# Patient Record
Sex: FEMALE | Race: WHITE | NOT HISPANIC OR LATINO | Employment: OTHER | URBAN - METROPOLITAN AREA
[De-identification: names, ages, dates, MRNs, and addresses within clinical notes are randomized per-mention and may not be internally consistent; named-entity substitution may affect disease eponyms.]

---

## 2017-02-24 ENCOUNTER — DOCUMENTATION ONLY (OUTPATIENT)
Dept: SLEEP MEDICINE | Facility: CLINIC | Age: 47
End: 2017-02-24

## 2017-02-24 NOTE — PROGRESS NOTES
Patient was seen for a mask fitting, she is currently wearing a wilcox FX pillow mask and would like to try a nasal mask. Patient chose the RM Airfit N20 mask.

## 2017-03-15 ENCOUNTER — TRANSFERRED RECORDS (OUTPATIENT)
Dept: HEALTH INFORMATION MANAGEMENT | Facility: CLINIC | Age: 47
End: 2017-03-15

## 2017-04-02 ENCOUNTER — HOSPITAL ENCOUNTER (EMERGENCY)
Facility: CLINIC | Age: 47
Discharge: HOME | End: 2017-04-02
Attending: PHYSICIAN ASSISTANT | Admitting: PHYSICIAN ASSISTANT
Payer: COMMERCIAL

## 2017-04-02 DIAGNOSIS — N30.00 ACUTE CYSTITIS WITHOUT HEMATURIA: ICD-10-CM

## 2017-04-02 LAB
ALBUMIN UR-MCNC: 10 MG/DL
APPEARANCE UR: ABNORMAL
BILIRUB UR QL STRIP: NEGATIVE
CAOX CRY #/AREA URNS HPF: ABNORMAL /HPF
COLOR UR AUTO: YELLOW
GLUCOSE UR STRIP-MCNC: NEGATIVE MG/DL
HGB UR QL STRIP: NEGATIVE
HYALINE CASTS #/AREA URNS LPF: 2 /LPF (ref 0–2)
KETONES UR STRIP-MCNC: 5 MG/DL
LEUKOCYTE ESTERASE UR QL STRIP: ABNORMAL
MUCOUS THREADS #/AREA URNS LPF: PRESENT /LPF
NITRATE UR QL: NEGATIVE
PH UR STRIP: 5.5 PH (ref 5–7)
RBC #/AREA URNS AUTO: 2 /HPF (ref 0–2)
SP GR UR STRIP: 1.02 (ref 1–1.03)
SQUAMOUS #/AREA URNS AUTO: 11 /HPF (ref 0–1)
URN SPEC COLLECT METH UR: ABNORMAL
UROBILINOGEN UR STRIP-MCNC: 2 MG/DL (ref 0–2)
WBC #/AREA URNS AUTO: 7 /HPF (ref 0–2)

## 2017-04-02 PROCEDURE — 99283 EMERGENCY DEPT VISIT LOW MDM: CPT

## 2017-04-02 PROCEDURE — 81001 URINALYSIS AUTO W/SCOPE: CPT | Performed by: PHYSICIAN ASSISTANT

## 2017-04-02 PROCEDURE — 87086 URINE CULTURE/COLONY COUNT: CPT | Performed by: PHYSICIAN ASSISTANT

## 2017-04-02 ASSESSMENT — ENCOUNTER SYMPTOMS
BACK PAIN: 0
HEMATURIA: 0

## 2017-04-03 LAB
BACTERIA SPEC CULT: NORMAL
Lab: NORMAL
MICRO REPORT STATUS: NORMAL
SPECIMEN SOURCE: NORMAL

## 2017-04-03 NOTE — ED PROVIDER NOTES
History     Chief Complaint:  UTI    HPI   Johanna Dominique is a 46 year old female who presents with a history of diabetes and UTIs, who presents with suprapubic pressure, dysuria and high frequency of urination. An associated symptom is a cold sore on her lower lip, which she has experiences in the presence of a fever, but has not otherwise felt feverish or taken her temperatire. This is consistent with her previous UTIs. This started while she was on vacation in [place]. She denies any back or flank pain, hematuria, abdominal pain, nausea or vomiting, vaginal symptoms, or any other complications or concerns.   Allergies:  NKDA      Medications:    Victoza  Metformin  Lantus  Hydrochlorothiazide     Past Medical History:    HTN  Type II DM  MO  Chronic nasal congestion      Past Surgical History:   Hysterectomy  Sinus surgery  Hip surgery x3      Family History:   History reviewed. No pertinent family history.     Social History:  The patient presented to the ED alone.   Smoking Status: Current every day smoker - 1.00 packs/day for 6 years  Smokeless Tobacco: Never used  Alcohol Use: No   Marital Status: Single     Review of Systems   Constitutional: Negative for fever.   Gastrointestinal: Negative for abdominal pain, nausea and vomiting.   Genitourinary: Positive for dysuria, frequency and pelvic pain. Negative for difficulty urinating, flank pain, hematuria, urgency, vaginal discharge and vaginal pain.   Musculoskeletal: Negative for back pain.   Skin: Positive for wound.        Positive for sores.   All other systems reviewed and are negative.        Physical Exam   First Vitals:   BP: 143/71  Pulse: 71 Resp: 18 SpO2: 96% on RA Temp: 98.6 oral      Physical Exam  General: Resting comfortably on the gurney.    Head:  The scalp, head and face appear normal. No evidence of trauma.   ENT:  Pupils are equal, round and reactive to light. EOM intact.     Oropharynx is moist.  No uvular deviation. Posterior oropharynx is  without erythema, exudate or tonsillar swelling.   Neck:  Supple, no rigidity noted. Normal ROM.     Trachea midline. No mass detected.    Lymph: No anterior or posterior cervical lymphadenopathy noted.   Resp:  Non-labored breathing. No tachypnea.     Lung fields clear to auscultation without wheezes or rales.   CV:  Regular rate and rhythm. Normal S1 and S2, no S3 or S4.     No pathological murmur detected.     Radial, DP and PT pulses intact and symmetric.   GI:  Abdomen is soft and non-distended.     Non-tender to palpation in all four quadrants.    MS:  Normal muscular tone.     Normal and symmetric motor strength of all four extremities.     No asymmetrical lower leg swelling or calf tenderness.   Neuro:  Awake and alert. Speech is clear.   Skin:  Just inferior to the lower lip on the right side there is a 0.2 cm raised lesion that has an erythematous base with overlying crusting. No fluctuance or spreading erythema.   Psych: Normal affect. Appropriate interactions.         Emergency Department Course     Laboratory:  Urine analysis: No bacteria, mucous present, squamous epithelial 11/HPF, trace leukocyte  esterase, albumin 10 mg/DL, urobilinogen 2.0 mg/dL, ketones 5 mg/dL, pH 5.5   UC: Pending  Emergency Department Course:  Nursing notes and vitals reviewed.  I performed an exam of the patient as documented above.  The above workup was undertaken.  1839: I rechecked the patient and discussed results.  Findings and plan explained to the Patient. Patient discharged home, status improved, with instructions regarding supportive care, medications, and reasons to return as well as the importance of close follow-up was reviewed. Patient was prescribed Keflex.    Impression & Plan      Medical Decision Making:  This patient has symptoms consistent with a urinary tract infection.  Urinalysis shows a few WBC's, but also signs of contamination. No obvious infection at this time, urine culture is pending. However, the  patient has a symptomatic UTI and history of them. I do feel it is reasonable to start her on antibiotic for this.  There has been no fever, back/flank pain or significant abdominal pain.  There is no clinical evidence of pyelonephritis, appendicitis,  or diverticulitis.  Follow up with primary physician is indicated if not improving in 2-3 days. I discussed the results, plan and any additional questions with the patient. She verbalized understanding and agreement with the plan.  We discussed reasons to return to the ED including increasing pain, vomiting, fever, inability to tolerate the oral antibiotic or if she becomes worse in any way.        Diagnosis:  1. Acute cystitis without hematuria         Disposition:  Discharged to home    Discharge Medications:   Keflex    CHAD, Bea Willis, am serving as a scribe on 4/2/2017 at 7:46 PM to personally document services performed by Trish Bowden based on my observations and the provider's statements to me.    EMERGENCY DEPARTMENT       Trish Bowden PA-C  04/04/17 4812

## 2017-04-04 ASSESSMENT — ENCOUNTER SYMPTOMS
NAUSEA: 0
FEVER: 0
DYSURIA: 1
FREQUENCY: 1
WOUND: 1
ABDOMINAL PAIN: 0
DIFFICULTY URINATING: 0
FLANK PAIN: 0
VOMITING: 0

## 2017-06-26 DIAGNOSIS — Z53.9 ERRONEOUS ENCOUNTER--DISREGARD: Primary | ICD-10-CM

## 2017-08-19 ENCOUNTER — HEALTH MAINTENANCE LETTER (OUTPATIENT)
Age: 47
End: 2017-08-19

## 2018-03-07 ENCOUNTER — DOCUMENTATION ONLY (OUTPATIENT)
Dept: SLEEP MEDICINE | Facility: CLINIC | Age: 48
End: 2018-03-07
Payer: COMMERCIAL

## 2018-03-07 NOTE — PROGRESS NOTES
JAN WANTED TO TRY FULL FACE MASK HEADGEAR DUE TO MOUTH BREATHING.  SHE TRIED AIRFIT F20 AND SIMPLUS MEDIUM.  HER MASK OF CHOICE IS AIRFIT F2O MEDIUM.  WENT OVER SUPPLY REPLACMENT SCHEDULE.  SHE IS GOPING TO % COTTON T-SHIRT AS A BARRIER ON FACE BEFORE PUTTING ON MASK TO PREVENT RED MARKS.

## 2018-05-19 ENCOUNTER — HOSPITAL ENCOUNTER (EMERGENCY)
Facility: CLINIC | Age: 48
Discharge: HOME | End: 2018-05-19
Attending: EMERGENCY MEDICINE | Admitting: EMERGENCY MEDICINE
Payer: COMMERCIAL

## 2018-05-19 VITALS
HEIGHT: 66 IN | RESPIRATION RATE: 16 BRPM | WEIGHT: 270 LBS | BODY MASS INDEX: 43.39 KG/M2 | SYSTOLIC BLOOD PRESSURE: 147 MMHG | DIASTOLIC BLOOD PRESSURE: 76 MMHG | TEMPERATURE: 99.6 F | OXYGEN SATURATION: 98 %

## 2018-05-19 DIAGNOSIS — B95.8 STAPH SKIN INFECTION: ICD-10-CM

## 2018-05-19 DIAGNOSIS — L08.9 STAPH SKIN INFECTION: ICD-10-CM

## 2018-05-19 PROCEDURE — 99282 EMERGENCY DEPT VISIT SF MDM: CPT

## 2018-05-19 NOTE — ED AVS SNAPSHOT
Emergency Department    [address] [facility] [address]    Phone:  [phone]    Fax:  [phone]                                       Johanna Dominique   MRN: [id number]    Department:   Emergency Department   Date of Visit:  5/19/2018           Patient Information     Date Of Birth          1970        Your diagnoses for this visit were:     Staph skin infection        You were seen by Johanna Bolaños MD.      Follow-up Information     Follow up with Pat Perez MD.    Specialty:  Internal Medicine    Why:  this week. And Infectious Disease at your clinic as well.     Contact information:    [facility] [place]  [address] [facility] [id number] [phone]        Discharge References/Attachments     STAPH SKIN INFECTION, POSSIBLE MRSA (ENGLISH)    STAPH INFECTION (NON-MRSA) (ENGLISH)      24 Hour Appointment Hotline       To make an appointment at any [facility], call [phone] ([phone]). If you don't have a family doctor or clinic, we will help you find one. [facility] are conveniently located to serve the needs of you and your family.             Review of your medicines      Our records show that you are taking the medicines listed below. If these are incorrect, please call your family doctor or clinic.        Dose / Directions Last dose taken    ATORVASTATIN CALCIUM PO        Refills:  0        hydrochlorothiazide 25 MG tablet   Commonly known as:  HYDRODIURIL   Dose:  25 mg        Take 25 mg by mouth daily.   Refills:  0        insulin glargine 100 UNIT/ML injection   Commonly known as:  LANTUS   Dose:  40 Units        Inject 40 Units Subcutaneous At Bedtime   Refills:  0        liraglutide 18 MG/3ML soln   Commonly known as:  VICTOZA   Dose:  1.2 mg        Inject 1.2 mg Subcutaneous daily   Refills:  0        METFORMIN HCL PO   Dose:  2000 mg        Take 2,000 mg by mouth States takes 2000mg once a day at dinner time   Refills:   0        order for DME   Quantity:  1 each        Please issue a cpap machine set at 15cmH2 w heated humidifier and nasal mask   Refills:  0                Orders Needing Specimen Collection     None      Pending Results     No orders found from 5/17/2018 to 5/20/2018.            Pending Culture Results     No orders found from 5/17/2018 to 5/20/2018.            Pending Results Instructions     If you had any lab results that were not finalized at the time of your Discharge, you can call the ED Lab Result RN at [phone]. You will be contacted by this team for any positive Lab results or changes in treatment. The nurses are available 7 days a week from 10A to 6:30P.  You can leave a message 24 hours per day and they will return your call.        Test Results From Your Hospital Stay               Clinical Quality Measure: Blood Pressure Screening     Your blood pressure was checked while you were in the emergency department today. The last reading we obtained was  BP: 183/83 . Please read the guidelines below about what these numbers mean and what you should do about them.  If your systolic blood pressure (the top number) is less than 120 and your diastolic blood pressure (the bottom number) is less than 80, then your blood pressure is normal. There is nothing more that you need to do about it.  If your systolic blood pressure (the top number) is 120-139 or your diastolic blood pressure (the bottom number) is 80-89, your blood pressure may be higher than it should be. You should have your blood pressure rechecked within a year by a primary care provider.  If your systolic blood pressure (the top number) is 140 or greater or your diastolic blood pressure (the bottom number) is 90 or greater, you may have high blood pressure. High blood pressure is treatable, but if left untreated over time it can put you at risk for heart attack, stroke, or kidney failure. You should have your blood pressure rechecked by a primary  care provider within the next 4 weeks.  If your provider in the emergency department today gave you specific instructions to follow-up with your doctor or provider even sooner than that, you should follow that instruction and not wait for up to 4 weeks for your follow-up visit.        Thank you for choosing [facility]       Thank you for choosing [facility] for your care. Our goal is always to provide you with excellent care. Hearing back from our patients is one way we can continue to improve our services. Please take a few minutes to complete the written survey that you may receive in the mail after you visit with us. Thank you!        [facility] Information     [facility] gives you secure access to your electronic health record. If you see a primary care provider, you can also send messages to your care team and make appointments. If you have questions, please call your primary care clinic.  If you do not have a primary care provider, please call [phone] and they will assist you.        Care EveryWhere ID     This is your Care EveryWhere ID. This could be used by other organizations to access your [facility] medical records  [id number]        Equal Access to Services     CIERRA GIL : Hadii kalpana perez Soyesica, waaxda luqadaha, qaybta kaalmajing muñoz, juventino milan. So [facility] [phone].    ATENCIÓN: Si habla español, tiene a hernandez disposición servicios gratuitos de asistencia lingüística. Llame al [phone].    We comply with applicable federal civil rights laws and [place] laws. We do not discriminate on the basis of race, color, national origin, age, disability, sex, sexual orientation, or gender identity.            After Visit Summary       This is your record. Keep this with you and show to your community pharmacist(s) and doctor(s) at your next visit.

## 2018-05-19 NOTE — ED AVS SNAPSHOT
Emergency Department    [address] [place] [address]    Phone:  [phone]    Fax:  [phone]                                       Johanna Dominique   MRN: [id number]    Department:   Emergency Department   Date of Visit:  5/19/2018           After Visit Summary Signature Page     I have received my discharge instructions, and my questions have been answered. I have discussed any challenges I see with this plan with the nurse or doctor.    ..........................................................................................................................................  Patient/Patient Representative Signature      ..........................................................................................................................................  Patient Representative Print Name and Relationship to Patient    ..................................................               ................................................  Date                                            Time    ..........................................................................................................................................  Reviewed by Signature/Title    ...................................................              ..............................................  Date                                                            Time

## 2018-05-19 NOTE — ED NOTES
"  History     Chief Complaint:  Skin lesions       HPI   Johanna Dominique is a 47 year old female who presents with skin lesions.  She has a several month history of having small areas of what sounds like abscesses on her skin that then drain and heal.  She notes some new ones on her hand and has some healing ones on her right breast and left flank.  She notes that the past she had something like this and did test positive for staph on the  nasal swab a number of years ago.  Then she was better for a while and now she again is having these symptoms.  She does not know if she tested positive for MRSA.  Boyfriend does not have similar symptoms.  She does have a low-grade fever here and says that she has been generally tired and the boyfriend's been ill with viral illness.  She denies any specific complaints related to acute illness herself.      Allergies:  No known drug allergies.     Medications:    Atorvastatin  HCTZ  Lantus  Victoza  Metformin     Past Medical History:    Allergic state  Diabetes  HTN    Past Surgical History:    Hysterectomy  Hip surgery  Sinus surgery    Family History:    History reviewed. No pertinent family history.    Social History:   reports that she has been smoking Cigarettes.  She has a 6.00 pack-year smoking history. She has never used smokeless tobacco. She reports that she does not drink alcohol or use illicit drugs.  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    PCP: Pat Perez     Review of Systems all other systems are negative other than HPI.      Physical Exam   Patient Vitals for the past 24 hrs:   BP Temp Temp src Heart Rate Resp SpO2 Height Weight   05/19/18 1757 183/83 99.6  F (37.6  C) Oral 99 18 96 % 1.676 m (5' 6\") 122.5 kg (270 lb)        Physical Exam     Constitutional:  Oriented to person, place, and time.      Appears well-developed and well-nourished.   HENT:   Head:    Normocephalic and atraumatic.   Right Ear:   Tympanic membrane and external " ear normal.   Left Ear:   Tympanic membrane and external ear normal.   Mouth/Throat:   Oropharynx is clear and moist.      Mucous membranes are normal.   Eyes:    Conjunctivae normal and EOM are normal.      Pupils are equal, round, and reactive to light.   Neck:    Normal range of motion. Neck supple.   Cardiovascular:  Normal rate, regular rhythm, S1 normal and S2 normal.      No gallop and no friction rub. No murmur heard.  Pulmonary/Chest:  Breath sounds normal. No respiratory distress.      No wheezes. No rhonchi. No rales.   Abdominal:   Soft. No hepatosplenomegaly. No tenderness.      No rebound and no CVA tenderness.   Musculoskeletal:  Normal range of motion.   Neurological:   Alert and oriented to person, place, and time. Normal strength.      GCS eye subscore is 4. GCS verbal subscore is 5.      GCS motor subscore is 6.   Skin:    Skin is warm and dry.  She has 4- 5 small less than 1 cm lesions which do not appear acutely infected in terms of abscess requiring drainage.  These are present right breast right hand and left flank.  Psychiatric:   Normal mood and affect.      Speech is normal and behavior is normal.      Judgment and thought content normal.      Cognition and memory are normal.     Emergency Department Course     Emergency Department Course:  Past medical records, nursing notes, and vitals reviewed.  I performed an exam of the patient and obtained history, as documented above.    I rechecked the patient. Findings and plan explained to the Patient and .     Impression & Plan      Medical Decision Making:  Patient presents with several month history of occasional small skin lesions which sound like abscesses.  She has tested positive for staph in the past.  I suspect that she is again colonized.  I do not see anything that looks infected now or she needs oral antibiotics.  I have advised that she contact her primary physician and/or infectious disease to see if there is a protocol that she can  use to try and rid herself of this colonization although she was told that is not 100% effective.  She has a low-grade fever which I suspect is more viral as boyfriend is been ill.  She can follow-up with her primary if she continues to feel tired for further evaluation.       Diagnosis:    ICD-10-CM    1. Staph skin infection L08.9     B95.8         5/19/2018   Johanna Bolaños Hollensteiner, Lisa, MD  05/21/18 1935

## 2018-09-17 DIAGNOSIS — G47.33 OBSTRUCTIVE SLEEP APNEA: Primary | ICD-10-CM

## 2018-12-24 ENCOUNTER — OFFICE VISIT (OUTPATIENT)
Dept: SLEEP MEDICINE | Facility: CLINIC | Age: 48
End: 2018-12-24
Payer: COMMERCIAL

## 2018-12-24 VITALS
HEART RATE: 77 BPM | BODY MASS INDEX: 42.36 KG/M2 | WEIGHT: 263.6 LBS | HEIGHT: 66 IN | RESPIRATION RATE: 16 BRPM | DIASTOLIC BLOOD PRESSURE: 80 MMHG | OXYGEN SATURATION: 96 % | SYSTOLIC BLOOD PRESSURE: 128 MMHG

## 2018-12-24 DIAGNOSIS — G47.00 ACUTE INSOMNIA: ICD-10-CM

## 2018-12-24 DIAGNOSIS — E66.01 MORBID OBESITY (H): ICD-10-CM

## 2018-12-24 DIAGNOSIS — G47.33 OSA (OBSTRUCTIVE SLEEP APNEA): Primary | ICD-10-CM

## 2018-12-24 PROCEDURE — 99214 OFFICE O/P EST MOD 30 MIN: CPT | Performed by: INTERNAL MEDICINE

## 2018-12-24 ASSESSMENT — MIFFLIN-ST. JEOR: SCORE: 1832.43

## 2018-12-24 NOTE — PROGRESS NOTES
Obstructive Sleep Apnea - PAP Follow-Up Visit:    Chief Complaint   Patient presents with     Sleep Apnea     CPAP follow up        Johanna Dominique comes in today for follow-up of their mild sleep apnea, managed with CPAP.     Medical history is significant for DM-2.     A sleep study done on 03/22/2006 at [facility] in [place], [place] showed an apnea-hypopnea index of 12.6 per hour.  Sleep disordered breathing was predominantly supine position-related with a supine AHI of 28.4 per hour and lateral AHI of 4.4 per hour.  Lowest oxygen saturation was 84%.     Overall, she rates the experience with PAP as 10 (0 poor, 10 great). The mask is comfortable. The mask is not leaking.  She is not snoring with the mask on. She is not having gasp arousals.  She is not having significant oral/nasal dryness. The pressure settings are comfortable.     She uses nasal mask.     Over last 2 months, patient has experienced sleep initiation and maintenance insomnia. There were no clear precipitation factors.     Bedtime is typically 9 pm. Some nights, it can take upto 1-2 hour st fall asleep.  She may wake up in the night and stay awake for about 1-2 hours without being able to return to sleep.  Wake time in the morning is at 6 am. Patient is using PAP therapy 7 hours per night. The patient is usually getting 6 hours of sleep per night.    She does feel rested in the morning.    Wallace Sleepiness Scale: 14/24    ResMed     Auto-PAP 13-18 cmH2O download:  30/30 total days of use. 0 nonuse days. 29/30 days with >4 hours use.  Average use 7.8 hours per day. Median Leak 1.5 L/min. CPAP 95% pressure 15.2cm. AHI 1.8    Reviewed by team:      Reviewed by provider:        Problem List:  Patient Active Problem List    Diagnosis Date Noted     ERRONEOUS ENCOUNTER--DISREGARD 06/26/2017     Priority: Medium     Obstructive sleep apnea 12/16/2012     Priority: Medium     Problem list name updated by automated process. Provider to review    "    Obesity 12/16/2012     Priority: Medium     Chronic nasal congestion 12/16/2012     Priority: Medium          /80   Pulse 77   Resp 16   Ht 1.676 m (5' 6\")   Wt 119.6 kg (263 lb 9.6 oz)   SpO2 96%   BMI 42.55 kg/m      Impression/Plan:     Mild sleep apnea.     -  Tolerating PAP well. Regular compliance and normal AHI is demonstrated on the device. daytime fatigue is experienced over last 2 months with insomnia.     Plan:     1. Continue auto PAP therapy. Prescription provided for replacemenet device     Insomnia    - Patient has sleep initiation and maintenance difficulty over last 2 months without any clear precipitation factors. Psychophysiologic hyperarousal is experienced and patient spends long period in bed.    - She was counseled regarding better stimulus control and restriction of time in bed for management.     - If insomnia continues, return to clinic to be seen by behavioral sleep medicine.     Plan:      1. Continue auto PAP therapy     Johanna GRUBER Trip will follow up in about 1 year(s).     Twenty-five minutes spent with patient, all of which were spent face-to-face counseling, consulting, coordinating plan of care.      Chun Chou MD, MD    CC:  Pat Perez,   "

## 2018-12-24 NOTE — PATIENT INSTRUCTIONS
1. Obstructive sleep apnea    - Continue CPAP treatment     2. Insomnia     You have symptoms of insomnia and would likely benefit from non-medication approaches to treatment.  Cognitive behavioral treatment (CBT) for insomnia is a very effective technique that involves changing your behaviors and thoughts associated with sleep.  People that are motivated to make changes in their sleep pattern are likely to benefit from self-help strategies for treatment of insomnia.  Several treatment books for insomnia are listed on our patient treatment resources.      Suggested Resources  Insomnia Treatment Books     Overcoming Insomnia by Aurelio Lee and Elizabeth Ashton (2008)    No More Sleepless Nights by Carlos Alberto Weeks and Minnie Bobby (1996)    Say Xavi to Insomnia by Tae Holland (2009)    The Insomnia Workbook by Sana Posey and Ronaldo Cleary (2009)    The Insomnia Answer by Raudel Fermin and Pedro Funk (2006)    Your Body mass index is 42.55 kg/m .  Weight management is a personal decision.  If you are interested in exploring weight loss strategies, the following discussion covers the approaches that may be successful. Body mass index (BMI) is one way to tell whether you are at a healthy weight, overweight, or obese. It measures your weight in relation to your height.  A BMI of 18.5 to 24.9 is in the healthy range. A person with a BMI of 25 to 29.9 is considered overweight, and someone with a BMI of 30 or greater is considered obese. More than two-thirds of American adults are considered overweight or obese.  Being overweight or obese increases the risk for further weight gain. Excess weight may lead to heart disease and diabetes.  Creating and following plans for healthy eating and physical activity may help you improve your health.  Weight control is part of healthy lifestyle and includes exercise, emotional health, and healthy eating habits. Careful eating habits lifelong are the mainstay of  weight control. Though there are significant health benefits from weight loss, long-term weight loss with diet alone may be very difficult to achieve- studies show long-term success with dietary management in less than 10% of people. Attaining a healthy weight may be especially difficult to achieve in those with severe obesity. In some cases, medications, devices and surgical management might be considered.  What can you do?  If you are overweight or obese and are interested in methods for weight loss, you should discuss this with your provider.     Consider reducing daily calorie intake by 500 calories.     Keep a food journal.     Avoiding skipping meals, consider cutting portions instead.    Diet combined with exercise helps maintain muscle while optimizing fat loss. Strength training is particularly important for building and maintaining muscle mass. Exercise helps reduce stress, increase energy, and improves fitness. Increasing exercise without diet control, however, may not burn enough calories to loose weight.       Start walking three days a week 10-20 minutes at a time    Work towards walking thirty minutes five days a week     Eventually, increase the speed of your walking for 1-2 minutes at time    In addition, we recommend that you review healthy lifestyles and methods for weight loss available through the National Institutes of Health patient information sites:  http://win.niddk.nih.gov/publications/index.htm    And look into health and wellness programs that may be available through your health insurance provider, employer, local community center, or carl club.    Weight management plan: Patient was referred to their PCP to discuss a diet and exercise plan.

## 2018-12-24 NOTE — NURSING NOTE
"Chief Complaint   Patient presents with     Sleep Apnea     CPAP follow up        Initial /80   Pulse 77   Resp 16   Ht 1.676 m (5' 6\")   Wt 119.6 kg (263 lb 9.6 oz)   SpO2 96%   BMI 42.55 kg/m   Estimated body mass index is 42.55 kg/m  as calculated from the following:    Height as of this encounter: 1.676 m (5' 6\").    Weight as of this encounter: 119.6 kg (263 lb 9.6 oz).    Medication Reconciliation: complete     ESS 14  Katalina Godinez        "

## 2019-01-02 ENCOUNTER — TELEPHONE (OUTPATIENT)
Dept: SLEEP MEDICINE | Facility: CLINIC | Age: 49
End: 2019-01-02

## 2019-01-02 NOTE — TELEPHONE ENCOUNTER
Patient's insurance originally stated patient will need an authorization before scheduling a PAP Setup. Received phone clal from [facility] insurance Auth department and was told that patient will not need a prior authorization for the CPAP machine. However, patient's insurance requires machine to be a rental until it meets purchase price. Called patient to go over the response we received from her insurance company. Patient stated she would like to think about whether or not she will want to go through with the replacement machine. Patient stated she will call me back if she would like to schedule an appointment. I gave patient my direct line and the CPAP HCPS codes for insurance.

## 2019-02-13 NOTE — TELEPHONE ENCOUNTER
Called patient to see if she has made a decision on getting setup on a replacement CPAP. Patient stated she has not had time to contact insurance and needs more time to think about it. She requested that I call her back in about 2 weeks.

## 2019-02-27 NOTE — TELEPHONE ENCOUNTER
Called patient back two weeks later for replacement CPAP scheduling, per patient request. Patient did not answer. Left voicemail for patient to call [facility] at [phone] for scheduling.

## 2020-05-19 ENCOUNTER — TELEPHONE (OUTPATIENT)
Dept: SLEEP MEDICINE | Facility: CLINIC | Age: 50
End: 2020-05-19

## 2020-05-19 DIAGNOSIS — G47.33 OBSTRUCTIVE SLEEP APNEA (ADULT) (PEDIATRIC): Primary | ICD-10-CM

## 2020-09-22 ENCOUNTER — TELEPHONE (OUTPATIENT)
Dept: SLEEP MEDICINE | Facility: CLINIC | Age: 50
End: 2020-09-22

## 2020-09-22 DIAGNOSIS — G47.33 OSA (OBSTRUCTIVE SLEEP APNEA): Primary | ICD-10-CM

## 2020-09-22 NOTE — Clinical Note
Hi Dr Chou,    Pt would like replacement CPAP as hers has met it's life expectancy.  Can you please sign the order that is pending for new device and supplies?    Thanks,    Casandra [facility]

## 2020-11-29 ENCOUNTER — HEALTH MAINTENANCE LETTER (OUTPATIENT)
Age: 50
End: 2020-11-29

## 2020-12-08 ENCOUNTER — MYC MEDICAL ADVICE (OUTPATIENT)
Dept: SLEEP MEDICINE | Facility: CLINIC | Age: 50
End: 2020-12-08

## 2020-12-11 ENCOUNTER — VIRTUAL VISIT (OUTPATIENT)
Dept: SLEEP MEDICINE | Facility: CLINIC | Age: 50
End: 2020-12-11
Payer: COMMERCIAL

## 2020-12-11 DIAGNOSIS — G47.33 OSA (OBSTRUCTIVE SLEEP APNEA): Primary | ICD-10-CM

## 2020-12-11 PROCEDURE — 99213 OFFICE O/P EST LOW 20 MIN: CPT | Mod: 95 | Performed by: INTERNAL MEDICINE

## 2020-12-11 RX ORDER — INSULIN DEGLUDEC 200 U/ML
INJECTION, SOLUTION SUBCUTANEOUS
COMMUNITY
Start: 2020-12-06

## 2020-12-11 RX ORDER — ATORVASTATIN CALCIUM 10 MG/1
10 TABLET, FILM COATED ORAL DAILY
COMMUNITY
Start: 2020-01-07 | End: 2023-10-05

## 2020-12-11 RX ORDER — EMPAGLIFLOZIN 25 MG/1
25 TABLET, FILM COATED ORAL DAILY
COMMUNITY
Start: 2020-11-02

## 2020-12-11 NOTE — PROGRESS NOTES
"Johanna Dominique is a 50 year old female who is being evaluated via a billable video visit.      The patient has been notified of following:     \"This video visit will be conducted via a call between you and your physician/provider. We have found that certain health care needs can be provided without the need for an in-person physical exam.  This service lets us provide the care you need with a video conversation.  If a prescription is necessary we can send it directly to your pharmacy.  If lab work is needed we can place an order for that and you can then stop by our lab to have the test done at a later time.    Video visits are billed at different rates depending on your insurance coverage.  Please reach out to your insurance provider with any questions.    If during the course of the call the physician/provider feels a video visit is not appropriate, you will not be charged for this service.\"    Patient has given verbal consent for Video visit? Yes  How would you like to obtain your AVS? MyChart  If you are dropped from the video visit, the video invite should be resent to: Text to cell phone: [phone]  Will anyone else be joining your video visit? No        Video-Visit Details    Type of service:  Video Visit    Video Start Time: 4:01 PM  Video End Time: 4:16 PM    Originating Location (pt. Location): Home    Distant Location (provider location):  [facility] SLEEP [facility]     Platform used for Video Visit: Violet Chou MD      Obstructive Sleep Apnea - PAP Follow-Up Visit:    Chief Complaint   Patient presents with     RECHECK     kike, Rx for new cpap device       Johanna Dominique comes in today for follow-up of their moderate sleep apnea, managed with CPAP.     Patient's sleep study was done on 03/22/2006 at [facility] in [place], [place] and showed an apnea-hypopnea index of 12.6 per hour.  Sleep disordered breathing was predominantly supine position-related with a supine AHI of " 28.4 per hour and lateral AHI of 4.4 per hour.  Lowest oxygen saturation was 84%.     Overall, she rates the experience with PAP as 10 (0 poor, 10 great). The mask is comfortable. The mask is not leaking.  She is not snoring with the mask on. She is not having gasp arousals.  She is not having significant oral/nasal dryness. The pressure settings are comfortable.     She uses nasal mask.     Bedtime is typically 9:30 pm. Usually it takes about 30 minutes to fall asleep with the mask on. Wake time is typically 6 am.  Patient is using PAP therapy 6-8 hours per night. The patient is usually getting 6-8 hours of sleep per night.    She does feel rested in the morning.    Total score - [place]: 7 (12/7/2020  4:47 PM)    ResMed     Auto-PAP 14-18 cmH2O download:  Formal download is not available     Reviewed by team: Tobacco  Allergies  Meds            Reviewed by provider:              Past Medical History:   Diagnosis Date     Allergic state      Diabetes (H)      Hypertension 2010     No family history on file.    Social History     Tobacco Use     Smoking status: Current Every Day Smoker     Packs/day: 1.00     Years: 6.00     Pack years: 6.00     Types: Cigarettes     Smokeless tobacco: Never Used     Tobacco comment: Info on smoking cessation given.   Substance Use Topics     Alcohol use: No     Drug use: No       Problem List:  Patient Active Problem List    Diagnosis Date Noted     Morbid obesity (H) 12/24/2018     Priority: Medium     ERRONEOUS ENCOUNTER--DISREGARD 06/26/2017     Priority: Medium     Obstructive sleep apnea 12/16/2012     Priority: Medium     Problem list name updated by automated process. Provider to review       Obesity 12/16/2012     Priority: Medium     Chronic nasal congestion 12/16/2012     Priority: Medium         ROS: 10 point ROS neg other than the symptoms noted above in the HPI.    There were no vitals taken for this visit.    Exam:  Constitutional: healthy, alert, active and no  distress  Respiratory: negative for cough or dyspnea  Neurologic: negative findings: speech normal, mental status intact  Psychiatric: mentation appears normal and affect normal/bright    Impression/Plan:     Mild obstructive sleep apnea.     -  Tolerating PAP well. Daytime symptoms are improved.    - Patient's device is beginning to malfunction and motor life is expiring. She is in need of a replacement device.      Plan:     1. Continue auto PAP therapy. Obtain replacement device through DME     Johanna Dominique will follow up in about 1 year(s).     Chun Chou MD, MD    CC:  Pat Perez,

## 2020-12-11 NOTE — PATIENT INSTRUCTIONS
Your There is no height or weight on file to calculate BMI.  Weight management is a personal decision.  If you are interested in exploring weight loss strategies, the following discussion covers the approaches that may be successful. Body mass index (BMI) is one way to tell whether you are at a healthy weight, overweight, or obese. It measures your weight in relation to your height.  A BMI of 18.5 to 24.9 is in the healthy range. A person with a BMI of 25 to 29.9 is considered overweight, and someone with a BMI of 30 or greater is considered obese. More than two-thirds of American adults are considered overweight or obese.  Being overweight or obese increases the risk for further weight gain. Excess weight may lead to heart disease and diabetes.  Creating and following plans for healthy eating and physical activity may help you improve your health.  Weight control is part of healthy lifestyle and includes exercise, emotional health, and healthy eating habits. Careful eating habits lifelong are the mainstay of weight control. Though there are significant health benefits from weight loss, long-term weight loss with diet alone may be very difficult to achieve- studies show long-term success with dietary management in less than 10% of people. Attaining a healthy weight may be especially difficult to achieve in those with severe obesity. In some cases, medications, devices and surgical management might be considered.  What can you do?  If you are overweight or obese and are interested in methods for weight loss, you should discuss this with your provider.     Consider reducing daily calorie intake by 500 calories.     Keep a food journal.     Avoiding skipping meals, consider cutting portions instead.    Diet combined with exercise helps maintain muscle while optimizing fat loss. Strength training is particularly important for building and maintaining muscle mass. Exercise helps reduce stress, increase energy, and  improves fitness. Increasing exercise without diet control, however, may not burn enough calories to loose weight.       Start walking three days a week 10-20 minutes at a time    Work towards walking thirty minutes five days a week     Eventually, increase the speed of your walking for 1-2 minutes at time    In addition, we recommend that you review healthy lifestyles and methods for weight loss available through the National Institutes of Health patient information sites:  http://win.niddk.nih.gov/publications/index.htm    And look into health and wellness programs that may be available through your health insurance provider, employer, local community center, or carl club.

## 2020-12-18 ENCOUNTER — DOCUMENTATION ONLY (OUTPATIENT)
Dept: SLEEP MEDICINE | Facility: CLINIC | Age: 50
End: 2020-12-18

## 2020-12-18 NOTE — PROGRESS NOTES
Patient was offered choice of vendor and chose [facility].  Patient Johanna Dominique was set up at [facility] on December 18, 2020. Patient received a Resmed AirSense 10 Auto. Pressures were set at 14-18 cm H2O.   Patient s ramp is 7 cm H2O for Auto and FLEX/EPR is EPR, 2.  Patient received a Resmed Mask name: Airtouch N20 Nasal mask size Medium, heated tubing and heated humidifier.  Patient does not need to meet compliance. Patient has a follow up on  Not scheduled with Dr. Chou.    Ellie Crowley

## 2020-12-21 ENCOUNTER — DOCUMENTATION ONLY (OUTPATIENT)
Dept: SLEEP MEDICINE | Facility: CLINIC | Age: 50
End: 2020-12-21
Payer: COMMERCIAL

## 2020-12-21 NOTE — PROGRESS NOTES
3 DAY STM VISIT    Diagnostic AHI: 12.6  PSG    Patient contacted for 3 day STM visit.    Confirmed with patient at time of call- No Patient is still interested in STM service     Subjective measures:  Took patient a few nights to get used to her foam mask. Patient is doing well with CPAP has used it the past 10 years.     Replacement device: Yes  STM ordered by provider: Yes     Device type: Auto-CPAP  PAP settings from order::  CPAP min 14 cm  H20       CPAP max 18 cm  H20      Mask type:    Nasal Mask     Device settings from machine      Min CPAP 14.0            Max CPAP 18.0          EPR level Setting: TWO      RESMED Soft response setting:  OFF  Assessment: Nightly usage over four hours.  Action plan: Patient removed from STM, STM not required or ordered. . Patient has a follow up visit scheduled:   no.     Total time spent on accessing and  interpreting remote patient PAP therapy data  10 minutes  Total time spent counseling, coaching  and reviewing PAP therapy data with patient  3 minutes  [id number] no

## 2021-01-05 ENCOUNTER — DOCUMENTATION ONLY (OUTPATIENT)
Dept: SLEEP MEDICINE | Facility: CLINIC | Age: 51
End: 2021-01-05

## 2021-01-05 NOTE — PROGRESS NOTES
14  DAY STM VISIT    Diagnostic AHI: 12.6    PSG    Message left for patient to return call     Assessment: Pt meeting objective benchmarks.  Leak is elevated on some nights     Action plan: waiting for patient to return call.       Device type: Auto-CPAP    PAP settings: CPAP min 14.0 cm  H20       CPAP max 18.0 cm  H20           95th% pressure 15.4 cm  H20        RESMED EPR level Setting: TWO    RESMED Soft response setting:  OFF    Mask type:  Nasal Mask    Objective measures: 14 day rolling measures      Compliance  100 %      Leak  40.32  lpm  last  upload      AHI 1.78   last  upload      Average number of minutes 560      Objective measure goal  Compliance   Goal >70%  Leak   Goal < 24 lpm  AHI  Goal < 5  Usage  Goal >240        Total time spent on accessing and interpreting remote patient PAP therapy data  10 minutes    Total time spent counseling, coaching  and reviewing PAP therapy data with patient  0 minutes

## 2021-01-05 NOTE — PROGRESS NOTES
Patient returned call.    Subjective measures:   Patient reports things are going well, with the  Exception of the mask fit. She will reach out to [facility]  Regarding changing masks.      Assessment: Pt meeting objective benchmarks.  Patient failing following subjective benchmarks: mask discomfort     Action plan:schedule mask fit appointment and follow up per provider request      Total time spent counseling, coaching  and reviewing PAP therapy data with patient  3 minutes     [id number] (this call)    [id number] no (previous call)

## 2021-09-19 ENCOUNTER — HEALTH MAINTENANCE LETTER (OUTPATIENT)
Age: 51
End: 2021-09-19

## 2021-10-11 DIAGNOSIS — G47.33 OSA (OBSTRUCTIVE SLEEP APNEA): Primary | ICD-10-CM

## 2021-10-15 ENCOUNTER — MYC MEDICAL ADVICE (OUTPATIENT)
Dept: SLEEP MEDICINE | Facility: CLINIC | Age: 51
End: 2021-10-15

## 2021-10-15 DIAGNOSIS — G47.33 OSA (OBSTRUCTIVE SLEEP APNEA): Primary | ICD-10-CM

## 2022-01-09 ENCOUNTER — HEALTH MAINTENANCE LETTER (OUTPATIENT)
Age: 52
End: 2022-01-09

## 2022-03-03 ENCOUNTER — APPOINTMENT (OUTPATIENT)
Dept: LAB | Age: 52
End: 2022-03-03

## 2022-03-03 DIAGNOSIS — E11.9 DIABETES MELLITUS WITHOUT COMPLICATION (CMD): Primary | ICD-10-CM

## 2022-03-03 LAB — HBA1C MFR BLD: 8.3 % (ref 4.5–5.6)

## 2022-03-03 PROCEDURE — 83036 HEMOGLOBIN GLYCOSYLATED A1C: CPT | Performed by: INTERNAL MEDICINE

## 2022-03-03 PROCEDURE — 36415 COLL VENOUS BLD VENIPUNCTURE: CPT | Performed by: INTERNAL MEDICINE

## 2022-06-24 ENCOUNTER — HOSPITAL ENCOUNTER (EMERGENCY)
Facility: CLINIC | Age: 52
Discharge: LEFT BEFORE DISCHARGE | End: 2022-06-24
Admitting: EMERGENCY MEDICINE
Payer: COMMERCIAL

## 2022-06-24 VITALS
WEIGHT: 255 LBS | SYSTOLIC BLOOD PRESSURE: 156 MMHG | HEIGHT: 66 IN | TEMPERATURE: 98.8 F | RESPIRATION RATE: 16 BRPM | HEART RATE: 65 BPM | OXYGEN SATURATION: 98 % | BODY MASS INDEX: 40.98 KG/M2 | DIASTOLIC BLOOD PRESSURE: 87 MMHG

## 2022-06-24 LAB — GLUCOSE BLDC GLUCOMTR-MCNC: 159 MG/DL (ref 70–99)

## 2022-06-24 PROCEDURE — 93005 ELECTROCARDIOGRAM TRACING: CPT | Performed by: STUDENT IN AN ORGANIZED HEALTH CARE EDUCATION/TRAINING PROGRAM

## 2022-06-24 PROCEDURE — 999N000104 HC STATISTIC NO CHARGE

## 2022-06-24 NOTE — ED TRIAGE NOTES
Pt states at 1515 today while walking into a shopping center felt a sudden onset of dizziness.  Episode lasted 10 seconds and she was able to finish her shopping.  Sincee that episode she has noticed palpitations and left sided facial numbness.  Numbness is resolving now.  No noted facial drooping or unilateral weakness.     Triage Assessment     Row Name 06/24/22 1613       Triage Assessment (Adult)    Airway WDL WDL       Respiratory WDL    Respiratory WDL WDL       Skin Circulation/Temperature WDL    Skin Circulation/Temperature WDL WDL       Cardiac WDL    Cardiac WDL WDL       Peripheral/Neurovascular WDL    Peripheral Neurovascular WDL WDL       Cognitive/Neuro/Behavioral WDL    Cognitive/Neuro/Behavioral WDL WDL

## 2022-06-25 LAB
ATRIAL RATE - MUSE: 66 BPM
DIASTOLIC BLOOD PRESSURE - MUSE: NORMAL MMHG
INTERPRETATION ECG - MUSE: NORMAL
P AXIS - MUSE: 23 DEGREES
PR INTERVAL - MUSE: 148 MS
QRS DURATION - MUSE: 82 MS
QT - MUSE: 396 MS
QTC - MUSE: 415 MS
R AXIS - MUSE: -6 DEGREES
SYSTOLIC BLOOD PRESSURE - MUSE: NORMAL MMHG
T AXIS - MUSE: 29 DEGREES
VENTRICULAR RATE- MUSE: 66 BPM

## 2022-11-21 ENCOUNTER — HEALTH MAINTENANCE LETTER (OUTPATIENT)
Age: 52
End: 2022-11-21

## 2022-12-25 ENCOUNTER — HEALTH MAINTENANCE LETTER (OUTPATIENT)
Age: 52
End: 2022-12-25

## 2023-03-12 ENCOUNTER — HOSPITAL ENCOUNTER (EMERGENCY)
Facility: CLINIC | Age: 53
Discharge: HOME | End: 2023-03-12
Attending: EMERGENCY MEDICINE | Admitting: EMERGENCY MEDICINE
Payer: COMMERCIAL

## 2023-03-12 ENCOUNTER — APPOINTMENT (OUTPATIENT)
Dept: CT IMAGING | Facility: CLINIC | Age: 53
End: 2023-03-12
Attending: STUDENT IN AN ORGANIZED HEALTH CARE EDUCATION/TRAINING PROGRAM
Payer: COMMERCIAL

## 2023-03-12 VITALS
RESPIRATION RATE: 18 BRPM | OXYGEN SATURATION: 94 % | SYSTOLIC BLOOD PRESSURE: 112 MMHG | HEART RATE: 77 BPM | WEIGHT: 250 LBS | DIASTOLIC BLOOD PRESSURE: 56 MMHG | BODY MASS INDEX: 40.18 KG/M2 | TEMPERATURE: 98.4 F | HEIGHT: 66 IN

## 2023-03-12 DIAGNOSIS — R10.12 LUQ ABDOMINAL PAIN: ICD-10-CM

## 2023-03-12 DIAGNOSIS — R11.0 NAUSEA: ICD-10-CM

## 2023-03-12 DIAGNOSIS — R81 GLUCOSURIA: ICD-10-CM

## 2023-03-12 DIAGNOSIS — R53.83 OTHER FATIGUE: ICD-10-CM

## 2023-03-12 LAB
ALBUMIN SERPL-MCNC: 4.2 G/DL (ref 3.5–5)
ALBUMIN UR-MCNC: NEGATIVE MG/DL
ALP SERPL-CCNC: 73 U/L (ref 45–120)
ALT SERPL W P-5'-P-CCNC: 21 U/L (ref 0–45)
ANION GAP SERPL CALCULATED.3IONS-SCNC: 13 MMOL/L (ref 5–18)
APPEARANCE UR: CLEAR
AST SERPL W P-5'-P-CCNC: 19 U/L (ref 0–40)
BASOPHILS # BLD AUTO: 0.1 10E3/UL (ref 0–0.2)
BASOPHILS NFR BLD AUTO: 1 %
BILIRUB SERPL-MCNC: 0.6 MG/DL (ref 0–1)
BILIRUB UR QL STRIP: NEGATIVE
BUN SERPL-MCNC: 19 MG/DL (ref 8–22)
CALCIUM SERPL-MCNC: 9.3 MG/DL (ref 8.5–10.5)
CHLORIDE BLD-SCNC: 102 MMOL/L (ref 98–107)
CO2 SERPL-SCNC: 24 MMOL/L (ref 22–31)
COLOR UR AUTO: ABNORMAL
CREAT SERPL-MCNC: 0.69 MG/DL (ref 0.6–1.1)
EOSINOPHIL # BLD AUTO: 0.1 10E3/UL (ref 0–0.7)
EOSINOPHIL NFR BLD AUTO: 1 %
ERYTHROCYTE [DISTWIDTH] IN BLOOD BY AUTOMATED COUNT: 13.9 % (ref 10–15)
GFR SERPL CREATININE-BSD FRML MDRD: >90 ML/MIN/1.73M2
GLUCOSE BLD-MCNC: 183 MG/DL (ref 70–125)
GLUCOSE UR STRIP-MCNC: >1000 MG/DL
HCT VFR BLD AUTO: 48.8 % (ref 35–47)
HGB BLD-MCNC: 15.5 G/DL (ref 11.7–15.7)
HGB UR QL STRIP: NEGATIVE
IMM GRANULOCYTES # BLD: 0 10E3/UL
IMM GRANULOCYTES NFR BLD: 0 %
KETONES UR STRIP-MCNC: NEGATIVE MG/DL
LEUKOCYTE ESTERASE UR QL STRIP: NEGATIVE
LIPASE SERPL-CCNC: 65 U/L (ref 0–52)
LYMPHOCYTES # BLD AUTO: 2.8 10E3/UL (ref 0.8–5.3)
LYMPHOCYTES NFR BLD AUTO: 35 %
MCH RBC QN AUTO: 25.5 PG (ref 26.5–33)
MCHC RBC AUTO-ENTMCNC: 31.8 G/DL (ref 31.5–36.5)
MCV RBC AUTO: 80 FL (ref 78–100)
MONOCYTES # BLD AUTO: 0.5 10E3/UL (ref 0–1.3)
MONOCYTES NFR BLD AUTO: 7 %
MONOCYTES NFR BLD AUTO: NEGATIVE %
MUCOUS THREADS #/AREA URNS LPF: PRESENT /LPF
NEUTROPHILS # BLD AUTO: 4.5 10E3/UL (ref 1.6–8.3)
NEUTROPHILS NFR BLD AUTO: 56 %
NITRATE UR QL: NEGATIVE
NRBC # BLD AUTO: 0 10E3/UL
NRBC BLD AUTO-RTO: 0 /100
PH UR STRIP: 5.5 [PH] (ref 5–7)
PLATELET # BLD AUTO: 288 10E3/UL (ref 150–450)
POTASSIUM BLD-SCNC: 4.1 MMOL/L (ref 3.5–5)
PROT SERPL-MCNC: 8.1 G/DL (ref 6–8)
RBC # BLD AUTO: 6.07 10E6/UL (ref 3.8–5.2)
RBC URINE: 1 /HPF
SODIUM SERPL-SCNC: 139 MMOL/L (ref 136–145)
SP GR UR STRIP: 1.03 (ref 1–1.03)
SQUAMOUS EPITHELIAL: 1 /HPF
UROBILINOGEN UR STRIP-MCNC: <2 MG/DL
WBC # BLD AUTO: 8 10E3/UL (ref 4–11)
WBC URINE: 4 /HPF

## 2023-03-12 PROCEDURE — 83690 ASSAY OF LIPASE: CPT | Performed by: STUDENT IN AN ORGANIZED HEALTH CARE EDUCATION/TRAINING PROGRAM

## 2023-03-12 PROCEDURE — 86308 HETEROPHILE ANTIBODY SCREEN: CPT | Performed by: EMERGENCY MEDICINE

## 2023-03-12 PROCEDURE — 85025 COMPLETE CBC W/AUTO DIFF WBC: CPT | Performed by: STUDENT IN AN ORGANIZED HEALTH CARE EDUCATION/TRAINING PROGRAM

## 2023-03-12 PROCEDURE — 250N000011 HC RX IP 250 OP 636: Performed by: EMERGENCY MEDICINE

## 2023-03-12 PROCEDURE — 80053 COMPREHEN METABOLIC PANEL: CPT | Performed by: STUDENT IN AN ORGANIZED HEALTH CARE EDUCATION/TRAINING PROGRAM

## 2023-03-12 PROCEDURE — 81001 URINALYSIS AUTO W/SCOPE: CPT | Performed by: EMERGENCY MEDICINE

## 2023-03-12 PROCEDURE — 36415 COLL VENOUS BLD VENIPUNCTURE: CPT | Performed by: STUDENT IN AN ORGANIZED HEALTH CARE EDUCATION/TRAINING PROGRAM

## 2023-03-12 PROCEDURE — 74177 CT ABD & PELVIS W/CONTRAST: CPT

## 2023-03-12 PROCEDURE — 99285 EMERGENCY DEPT VISIT HI MDM: CPT | Mod: 25

## 2023-03-12 RX ORDER — IOPAMIDOL 755 MG/ML
100 INJECTION, SOLUTION INTRAVASCULAR ONCE
Status: COMPLETED | OUTPATIENT
Start: 2023-03-12 | End: 2023-03-12

## 2023-03-12 RX ADMIN — IOPAMIDOL 100 ML: 755 INJECTION, SOLUTION INTRAVENOUS at 17:07

## 2023-03-12 NOTE — DISCHARGE INSTRUCTIONS
You were seen in the emergency department for fatigue, abdominal pain, and nausea.    There are several over the counter medications you can try to alleviate the symptoms if acid reflux is contributing.   - Famotidine (Pepcid) is an H2 blockers that is cheap, effective and has low risk of side effects.  - Omeprazole (Prilosec) is another medication that can be tried instead to relieve symptoms.    - These do not work rapidly, and can take several days to notice the effects. If there is noimprovement within 2 weeks you will likely need reassessment for the EGD.    Please return to theEmergency Department immediately if you experience chest pain, difficulty breathing, inability to keep food/fluids down, and/or if your symptoms get worse, do not improve, or you develop any new or worrisome symptoms. Otherwise, please follow up with your primary physician within the next 5-7 days for recheck and ongoing management. I have also included the number for [facility] so you can call them to schedule a follow up as well.     Below is some information you might find useful.     Thank you for choosing [facility]. It was a pleasure taking care of you today!  -Dr. Serena Otto

## 2023-03-12 NOTE — ED TRIAGE NOTES
PT states that about 10 days ago she noticed that she has had LT sided flank and ULQ pain. She states that she has had no improvement. Pt has been having mucus looking stools every so often. No HX of crones or ulcerative colitic.      Triage Assessment     Row Name 03/12/23 4723       Triage Assessment (Adult)    Airway WDL WDL       Respiratory WDL    Respiratory WDL WDL       Skin Circulation/Temperature WDL    Skin Circulation/Temperature WDL WDL       Cardiac WDL    Cardiac WDL WDL       Peripheral/Neurovascular WDL    Peripheral Neurovascular WDL WDL       Cognitive/Neuro/Behavioral WDL    Cognitive/Neuro/Behavioral WDL WDL

## 2023-03-12 NOTE — ED PROVIDER NOTES
EMERGENCY DEPARTMENT ENCOUNTER      NAME: Johanna Dominique  YOB: 1970  MRN: [id number]    FINAL IMPRESSION  1. Other fatigue    2. LUQ abdominal pain    3. Nausea    4. Glucosuria        MEDICAL DECISION MAKING   Pertinent Labs & Imaging studies reviewed. (See chart for details)    Johanna Dominique is a 52 year old female who presents for evaluation of left upper quadrant, abdominal pain, nausea, change in stools, and generalized fatigue. It has been ongoing for ~10 days. She does not have any associated fever, chills, emesis, diarrhea, constipation, or urinary symptoms. She also has no associated chest pain, difficulty, breathing, or other new complaints. She denies recent travel or sick contacts. Vitals on arrival stable and reassuring. Remainder of history and physical exam, as below.    I considered a broad differential including but not limited to GERD, PUD, gastritis, pancreatitis, hepatobiliary disease, gastroenteritis, diverticulitis, hernia, small bowel obstruction/ileus, mononucleosis or other viral illness, anemia, electrolyte derangement. I have lower suspicion for symptoms secondary to cardiopulmonary or vascular process given history and exam.  Discussed options for workup with the patient. We agreed on plan for labs, UA, CT A/P. Patient declined offer for analgesic/antiemetic but will update if she changes her mind.     Workup today was overall unremarkable, as above. CBC reassuring. No evidence of leukocytosis to suggest systemic infectious/inflammatory process. No acute anemia. PLTs wnl. CMP reassuring. No evidence of BECK, acidosis, or significant electrolyte derangement. No acute elevation of bilirubin or transaminates to suggest acute hepatobiliary process. Monospot negative. Lipase slightly elevated but not high enough to suggest acute pancreatitis. UA was notable for significant glucose but patient attributes this to the DM medication she is on which I believe is likely. She has no  ketones or acidosis to suggest DKA. CT showed no evidence of acute process to explain symptoms.     At this point, etiology of symptoms is unclear. GERD, viral illness, or potentially IBD/IBS (especially given positive family history and change in stools), among others remain on the differential. On repeat evaluation, patient reported feeling about the same but continued to decline offer for analgesic/anti-emetic or PPI. I reviewed results and she felt overall reassured. We discussed options for ongoing workup and management of symptoms. I again offered a dose of IV Pepcid or even GI consult today, but she declined, as she has pepcid at home and would rather follow up with specialists in clinic. Patient has been able to tolerate PO and vitals are stable so I do feel this is reasonable. Ultimately, we agreed on plan for discharge with a trial of OTC PPI for 14 days, Tylenol/Motrin, plenty of fluids, and follow up with primary care provider as well as [facility].     Strict return precautions and follow up recommendations were discussed and all questions were answered. Patient endorsed understanding and was in agreement with plan.      Medical Decision Making    History:    Supplemental history from: Documented in chart, if applicable    External Record(s) reviewed: Documented in chart, if applicable.    Work Up:    Chart documentation includes differential considered and any EKGs or imaging independently interpreted by provider, where specified.    In additional to work up documented, I considered the following work up: Documented in chart, if applicable.    External consultation:    Discussion of management with another provider: Documented in chart, if applicable    Complicating factors:    Care impacted by chronic illness: Diabetes, Hypertension and Other: Hepatic steatosis    Care affected by social determinants of health: N/A    Disposition considerations: Discharge. I recommended prescription strength medication(s)  "pepcid, but patient declined (states she has at home or can  at pharmacy OTC). See documentation for any additional details.          ED COURSE  4:13 PM I met with the patient to gather history and perform my exam. ED course and treatment discussed. Patient seen in [facility] due to critical capacity leaving no ED rooms available.   5:49 PM I updated the patient with lab and imaging results and discussed the plan for discharge      MEDICATIONS GIVEN IN THE ED  Medications   iopamidol (ISOVUE-370) solution 100 mL (100 mLs Intravenous $Given 3/12/23 5291)       NEW PRESCRIPTIONS STARTED AT TODAY'S VISIT  Discharge Medication List as of 3/12/2023  6:05 PM             =================================================================    Chief Complaint   Patient presents with     Abdominal Pain     Flank Pain         HPI:    Patient information was obtained from: Patient     Use of : N/A Language     Johanna Dominique is a 52 year old female who presents with abdominal pain     The patient reports they have had \"consistent, achy\" left upper quadrant abdominal pain for ten days.  Over the past ten days, \"little by little\", the abdominal pain worsened. Along with the abdominal pain, the patient complains of abdominal bloating, body aches, fatigue, headache, chills, \"quite a bit\" of nausea, and bowel movements that have more \"mucous\" and look \"puffy\". The patient also notes they have been \"sleeping a lot\", but they still feel \"exhausted\". The patient notes they feel their neck is swollen, but not tender. The patient notes they have had mono before and these symptoms feel similar. The patient denies constipation, vomiting, diarrhea, fever, cough, dysuria, hematuria, sore throat, or congestion.    The patient reports a history of a cholecystectomy and hysterectomy. The patient notes when they had mono, their spleen \"ripped\", but \"self encapsulated\".             RELEVANT HISTORY, MEDICATIONS, & ALLERGIES   Past medical " "history, surgical history, family history, medications, and allergies reviewed and pertinent noted in HPI.    REVIEW OF SYSTEMS:  A complete review of systems was performed with pertinent positives and negatives noted in the HPI. All other systems negative.     PHYSICAL EXAM:    Vitals: /56   Pulse 77   Temp 98.4  F (36.9  C) (Oral)   Resp 18   Ht 1.676 m (5' 6\")   Wt 113.4 kg (250 lb)   SpO2 94%   BMI 40.35 kg/m     General: Alert and interactive, appears slightly fatigued but comfortable and in no acute distress.   HENT: Atraumatic. Full AROM of neck. Very mild tenderness to anterior and posterior cervical LNs. Oropharynx clear. Conjunctiva clear.   Cardiovascular: Regular rate and rhythm.   Chest/Pulmonary: Normal work of breathing. Speaking in complete sentences. Lungs CTAB. No chest wall tenderness or deformities.  Abdomen: Soft, nondistended. Very mild TTP epigastric and LUQ without guarding or rebound.  Back: No CVA or flank tenderness.   Extremities: Normal AROM of all major joints.  Skin: Warm and dry. Normal skin color.   Neuro: Speech clear. CNs grossly intact. Moves all extremities spontaneously.   Psych: Normal affect/mood, cooperative, memory appropriate.    LAB  Labs Ordered and Resulted from Time of ED Arrival to Time of ED Departure   COMPREHENSIVE METABOLIC PANEL - Abnormal       Result Value    Sodium 139      Potassium 4.1      Chloride 102      Carbon Dioxide (CO2) 24      Anion Gap 13      Urea Nitrogen 19      Creatinine 0.69      Calcium 9.3      Glucose 183 (*)     Alkaline Phosphatase 73      AST 19      ALT 21      Protein Total 8.1 (*)     Albumin 4.2      Bilirubin Total 0.6      GFR Estimate >90     LIPASE - Abnormal    Lipase 65 (*)    ROUTINE UA WITH MICROSCOPIC REFLEX TO CULTURE - Abnormal    Color Urine Light Yellow      Appearance Urine Clear      Glucose Urine >1000 (*)     Bilirubin Urine Negative      Ketones Urine Negative      Specific Gravity Urine 1.034 (*)     " Blood Urine Negative      pH Urine 5.5      Protein Albumin Urine Negative      Urobilinogen Urine <2.0      Nitrite Urine Negative      Leukocyte Esterase Urine Negative      Mucus Urine Present (*)     RBC Urine 1      WBC Urine 4      Squamous Epithelials Urine 1     CBC WITH PLATELETS AND DIFFERENTIAL - Abnormal    WBC Count 8.0      RBC Count 6.07 (*)     Hemoglobin 15.5      Hematocrit 48.8 (*)     MCV 80      MCH 25.5 (*)     MCHC 31.8      RDW 13.9      Platelet Count 288      % Neutrophils 56      % Lymphocytes 35      % Monocytes 7      % Eosinophils 1      % Basophils 1      % Immature Granulocytes 0      NRBCs per 100 WBC 0      Absolute Neutrophils 4.5      Absolute Lymphocytes 2.8      Absolute Monocytes 0.5      Absolute Eosinophils 0.1      Absolute Basophils 0.1      Absolute Immature Granulocytes 0.0      Absolute NRBCs 0.0     MONONUCLEOSIS SCREEN - Normal    Mononucleosis Screen Negative         RADIOLOGY  CT Abdomen Pelvis w Contrast   Final Result   IMPRESSION:    1.  No renal calculi or hydronephrosis.   2.  Sigmoid diverticula.   3.  No findings to account for the patient's left flank pain.          All laboratory and imaging results and EKG's were personally reviewed and interpreted by myself prior to disposition decision.       I, Nia Thomas, am serving as a scribe to document services personally performed by Dr. Serena Otto based on my observation and the provider's statements to me. I, Serena Otto MD attest that Nia Thomas is acting in a scribe capacity, has observed my performance of the services and has documented them in accordance with my direction.    Serena Otto M.D.  Emergency Medicine  [facility] EMERGENCY ROOM  [address] [id number] [phone]  Dept: [phone]     Serena Otto MD  03/13/23 4923

## 2023-04-16 ENCOUNTER — HEALTH MAINTENANCE LETTER (OUTPATIENT)
Age: 53
End: 2023-04-16

## 2023-05-23 ENCOUNTER — LAB REQUISITION (OUTPATIENT)
Dept: LAB | Facility: CLINIC | Age: 53
End: 2023-05-23

## 2023-05-23 DIAGNOSIS — Z13.220 ENCOUNTER FOR SCREENING FOR LIPOID DISORDERS: ICD-10-CM

## 2023-05-23 DIAGNOSIS — E11.65 TYPE 2 DIABETES MELLITUS WITH HYPERGLYCEMIA (H): ICD-10-CM

## 2023-05-23 LAB
ALBUMIN SERPL BCG-MCNC: 4.4 G/DL (ref 3.5–5.2)
ALP SERPL-CCNC: 54 U/L (ref 35–104)
ALT SERPL W P-5'-P-CCNC: 23 U/L (ref 10–35)
ANION GAP SERPL CALCULATED.3IONS-SCNC: 13 MMOL/L (ref 7–15)
AST SERPL W P-5'-P-CCNC: 25 U/L (ref 10–35)
BILIRUB SERPL-MCNC: 0.7 MG/DL
BUN SERPL-MCNC: 22.5 MG/DL (ref 6–20)
CALCIUM SERPL-MCNC: 9.6 MG/DL (ref 8.6–10)
CHLORIDE SERPL-SCNC: 98 MMOL/L (ref 98–107)
CHOLEST SERPL-MCNC: 113 MG/DL
CREAT SERPL-MCNC: 0.65 MG/DL (ref 0.51–0.95)
DEPRECATED HCO3 PLAS-SCNC: 28 MMOL/L (ref 22–29)
GFR SERPL CREATININE-BSD FRML MDRD: >90 ML/MIN/1.73M2
GLUCOSE SERPL-MCNC: 129 MG/DL (ref 70–99)
HDLC SERPL-MCNC: 53 MG/DL
LDLC SERPL CALC-MCNC: 45 MG/DL
NONHDLC SERPL-MCNC: 60 MG/DL
POTASSIUM SERPL-SCNC: 3.7 MMOL/L (ref 3.4–5.3)
PROT SERPL-MCNC: 6.7 G/DL (ref 6.4–8.3)
SODIUM SERPL-SCNC: 139 MMOL/L (ref 136–145)
TRIGL SERPL-MCNC: 76 MG/DL

## 2023-05-23 PROCEDURE — 80061 LIPID PANEL: CPT | Performed by: PHYSICIAN ASSISTANT

## 2023-05-23 PROCEDURE — 80053 COMPREHEN METABOLIC PANEL: CPT | Performed by: PHYSICIAN ASSISTANT

## 2023-06-02 ENCOUNTER — HEALTH MAINTENANCE LETTER (OUTPATIENT)
Age: 53
End: 2023-06-02

## 2023-09-17 ENCOUNTER — HEALTH MAINTENANCE LETTER (OUTPATIENT)
Age: 53
End: 2023-09-17

## 2023-09-25 ENCOUNTER — ANCILLARY ORDERS (OUTPATIENT)
Dept: MAMMOGRAPHY | Facility: CLINIC | Age: 53
End: 2023-09-25

## 2023-09-25 DIAGNOSIS — Z12.31 SCREENING MAMMOGRAM, ENCOUNTER FOR: Primary | ICD-10-CM

## 2023-10-05 ENCOUNTER — OFFICE VISIT (OUTPATIENT)
Dept: VASCULAR SURGERY | Facility: CLINIC | Age: 53
End: 2023-10-05
Payer: COMMERCIAL

## 2023-10-05 DIAGNOSIS — I83.813 VARICOSE VEINS OF BOTH LOWER EXTREMITIES WITH PAIN: Primary | ICD-10-CM

## 2023-10-05 PROCEDURE — 99203 OFFICE O/P NEW LOW 30 MIN: CPT | Performed by: SURGERY

## 2023-10-05 RX ORDER — SEMAGLUTIDE 2.68 MG/ML
INJECTION, SOLUTION SUBCUTANEOUS
COMMUNITY
Start: 2023-08-28

## 2023-10-05 RX ORDER — ROSUVASTATIN CALCIUM 10 MG/1
TABLET, COATED ORAL
COMMUNITY

## 2023-10-05 NOTE — LETTER
10/5/2023         RE: Johanna Dominique  [address] [address]        Dear Colleague,    Thank you for referring your patient, Johanna Dominique, to the [facility] [place]. Please see a copy of my visit note below.    It was a pleasure to see Johanna Dominique in the vein clinic today.  She is a 52-year-old female who comes to us for evaluation of bilateral lower extremity discomfort.  She tells me she has had a large cluster of varicose veins in the right lower extremity and a smaller one in the left lower extremity for about 4 years.  This has been causing significant discomfort with pain, heaviness, burning and pruritus.  She tells me that she used to be overweight and then she started taking her health more seriously and over the last few years has gone on to lose about 70 pounds of weight with daily exercise healthier eating and a calorie deficit diet.  Incidentally she also had an increase in the dose of Ozempic to control her diabetes.  With the very active lifestyle that she has now adopted the symptoms of the varicose veins have actually gotten worse.  Over the past 1 year or so she started wearing compression stockings to minimize the symptoms with the results have been not very rewarding for her.  Nevertheless she continues her active lifestyle and request for continued weight loss.    Specifically for symptoms she experiences pain as the day progresses.  She has also been doing intermittent leg elevation.  She used to work in [email or web address] as a manager.    No significant family history.    On examination: She has a large cluster of varicose veins in the right thigh going down to the knee and over the lateral leg.  The smaller cluster on the left side.    No changes of chronic venous insufficiency.    Diagnosis: Symptomatic, lifestyle limiting bilateral lower extremity, right greater than left varicose veins.    Plan: I explained the pathophysiology of the disease to her in detail.  I  congratulated her on the great effort she has put in to achieve a healthy lifestyle with weight loss and continuous activity.  Unfortunately this is now interfering with her day-to-day living and her desire to stay healthy and away that is not responding to intermittent leg elevation and external compression with compression stockings.    My proposal would be to proceed with bilateral lower extremity sonography to evaluate for venous reflux in both lower extremities.    I look forward to seeing her back with bilateral lower extremity venous duplex sonography for reflux.      Again, thank you for allowing me to participate in the care of your patient.        Sincerely,        Trung Mullen MD

## 2023-10-05 NOTE — PROGRESS NOTES
It was a pleasure to see Johanna Dominique in the vein clinic today.  She is a 52-year-old female who comes to us for evaluation of bilateral lower extremity discomfort.  She tells me she has had a large cluster of varicose veins in the right lower extremity and a smaller one in the left lower extremity for about 4 years.  This has been causing significant discomfort with pain, heaviness, burning and pruritus.  She tells me that she used to be overweight and then she started taking her health more seriously and over the last few years has gone on to lose about 70 pounds of weight with daily exercise healthier eating and a calorie deficit diet.  Incidentally she also had an increase in the dose of Ozempic to control her diabetes.  With the very active lifestyle that she has now adopted the symptoms of the varicose veins have actually gotten worse.  Over the past 1 year or so she started wearing compression stockings to minimize the symptoms with the results have been not very rewarding for her.  Nevertheless she continues her active lifestyle and request for continued weight loss.    Specifically for symptoms she experiences pain as the day progresses.  She has also been doing intermittent leg elevation.  She used to work in [employer] as a manager.    No significant family history.    On examination: She has a large cluster of varicose veins in the right thigh going down to the knee and over the lateral leg.  The smaller cluster on the left side.    No changes of chronic venous insufficiency.    Diagnosis: Symptomatic, lifestyle limiting bilateral lower extremity, right greater than left varicose veins.    Plan: I explained the pathophysiology of the disease to her in detail.  I congratulated her on the great effort she has put in to achieve a healthy lifestyle with weight loss and continuous activity.  Unfortunately this is now interfering with her day-to-day living and her desire to stay healthy and away that is not responding  to intermittent leg elevation and external compression with compression stockings.    My proposal would be to proceed with bilateral lower extremity sonography to evaluate for venous reflux in both lower extremities.    I look forward to seeing her back with bilateral lower extremity venous duplex sonography for reflux.

## 2023-10-05 NOTE — NURSING NOTE
Patient Reported symptoms:    Bilateral leg   Heaviness Some of the time   Achiness A good bit of the time   Swelling None of the time   Throbbing A good bit of the time   Itching A good bit of the time   Appearance Very noticeable   Impact on work/activities Moderately reduced

## 2023-10-16 ENCOUNTER — HOSPITAL ENCOUNTER (OUTPATIENT)
Dept: MAMMOGRAPHY | Facility: CLINIC | Age: 53
Discharge: HOME | End: 2023-10-16
Attending: PHYSICIAN ASSISTANT | Admitting: PHYSICIAN ASSISTANT
Payer: COMMERCIAL

## 2023-10-16 DIAGNOSIS — Z12.31 SCREENING MAMMOGRAM, ENCOUNTER FOR: ICD-10-CM

## 2023-10-16 PROCEDURE — 77067 SCR MAMMO BI INCL CAD: CPT

## 2023-10-18 ENCOUNTER — ANCILLARY PROCEDURE (OUTPATIENT)
Dept: ULTRASOUND IMAGING | Facility: CLINIC | Age: 53
End: 2023-10-18
Attending: SURGERY
Payer: COMMERCIAL

## 2023-10-18 DIAGNOSIS — I83.813 VARICOSE VEINS OF BOTH LOWER EXTREMITIES WITH PAIN: ICD-10-CM

## 2023-10-18 PROCEDURE — 93970 EXTREMITY STUDY: CPT | Performed by: SURGERY

## 2023-11-09 ENCOUNTER — OFFICE VISIT (OUTPATIENT)
Dept: VASCULAR SURGERY | Facility: CLINIC | Age: 53
End: 2023-11-09
Payer: COMMERCIAL

## 2023-11-09 ENCOUNTER — ALLIED HEALTH/NURSE VISIT (OUTPATIENT)
Dept: VASCULAR SURGERY | Facility: CLINIC | Age: 53
End: 2023-11-09
Payer: COMMERCIAL

## 2023-11-09 DIAGNOSIS — I83.813 VARICOSE VEINS OF BOTH LOWER EXTREMITIES WITH PAIN: Primary | ICD-10-CM

## 2023-11-09 PROCEDURE — 99207 PR NO CHARGE NURSE ONLY: CPT

## 2023-11-09 PROCEDURE — A6533 GC STOCKING THIGHLNGTH 18-30: HCPCS

## 2023-11-09 PROCEDURE — 99214 OFFICE O/P EST MOD 30 MIN: CPT | Performed by: SURGERY

## 2023-11-09 NOTE — PROGRESS NOTES
Patient purchased 1 pair(s) of black, thigh high, closed-toe, size 3 compression hose from the clinic today.     Informed patient all compression hose purchases are final.    Gayathri Gale RN  [facility]  Vein Clinic

## 2023-11-09 NOTE — LETTER
11/9/2023         RE: Johanna Dominique  [address] [address]        Dear Colleague,    Thank you for referring your patient, Johanna Dominique, to the [facility] [place]. Please see a copy of my visit note below.      November 9, 2023    Vein Procedure Recommendation    Spoke with patient in clinic.    Dr. Mullen has recommended patient to have the following vein procedure(s):     1. Right leg VNUS closure ASV and 1 unit Phlebectomies (medically necessary)    2. Left leg VNUS closure GSV and 0.5 unit Phlebectomies (medically necessary)    If possible with tumescent amount, OK for pt to have both legs done same day per Dr. Mullen.     Patient Pre-op Questions:  Preferred Pharmacy: [facility] in [place] on [address]  Anticoagulant/ASA: Yes, ASA 81mg - pt will hold x 3 days prior  Artificial Joint or Heart Valve:  No  Open ulcer:  No  Sedation nausea: No    Patient is recommended to wear Thigh High compression hose following her procedure. Discussed compression hose. Pt purchased thigh high hose from clinic today.    Handed patient written procedure instructions to review on her own (see After Visit Summary).    Next steps:    Pre-scheduled pt today for Tuesday 12/12 at 0730 (0630 check in) with Dr. Mullen. (See appt desk) Because it is only 4 weeks from when Fariha Talamantes, surgery scheduler will be submitting to pt's insurance, pt is fully aware if approval is not rec'd in time, her procedure may need to be cancelled. Pt also aware her left leg may need to be done another day if right leg requires too much Tumescent. Per Dr. Mullen, will try to do both legs on same day.    Insurance Submission  Informed patient this process could take up to 14 business days, but once approved, the patient will be contacted by our surgery scheduler to schedule the above procedure. Gave patient our surgery scheduler's information.    Patient is in agreement with all of the above and has no further questions at  this time.    Gayathri Gale RN  [facility]  Vein Clinic    It was a pleasure to see Johanna Dominique again in the clinic today.    She is a 52-year-old female who I had seen previously for evaluation of bilateral lower extremity discomfort.  She tells me she has had a large cluster of varicose veins in the right lower extremity and a smaller one in the left lower extremity for about 4 years.  This has been causing significant discomfort with pain, heaviness, burning and pruritus.  She tells me that she used to be overweight and then she started taking her health more seriously and over the last few years has gone on to lose about 70 pounds of weight with daily exercise healthier eating and a calorie deficit diet.  Incidentally she also had an increase in the dose of Ozempic to control her diabetes.  With the very active lifestyle that she has now adopted the symptoms of the varicose veins have actually gotten worse.  Over the past 1 year or so she started wearing compression stockings to minimize the symptoms with the results have been not very rewarding for her.  Nevertheless she continues her active lifestyle and request for continued weight loss.     Specifically for symptoms she experiences pain as the day progresses.  She has also been doing intermittent leg elevation.  She used to work in [employer] as a manager.     No significant family history.     On examination: She has a large cluster of varicose veins in the right thigh going down to the knee and over the lateral leg.  The smaller cluster on the left side.     No changes of chronic venous insufficiency.     Diagnosis: Symptomatic, lifestyle limiting bilateral lower extremity, right greater than left varicose veins.    She got sonography of bilateral lower extremities.    On the right side the great saphenous vein is incompetent from the saphenofemoral junction to the distal thigh.  The great saphenous vein gives rise to a varicose branch measuring 2.2 mm  of the proximal calf that courses medial and has a reflux time of 1452 ms.    The majority of the symptomatic veins are actually located in the anterior and lateral thigh that then coursed down over the lateral aspect of the knee and into the calf.  These are actually arising from the anterior accessory saphenous vein which is incompetent.  It measures 5.9 mm at that level.  It has a reflux time of 3365 ms.  The anterior accessory saphenous vein gives rise to a varicose vein branch measuring 4.5 mm of the proximal thigh 9 cm below the saphenofemoral junction.  This courses anterolaterally and has a reflux time of 957 ms.  The small saphenous vein is also showing segmental incompetence.  The saphenous popliteal junction itself is competent.  The small saphenous vein is incompetent from the mid calf to distal calf with a reflux time of 4108 ms.  The small saphenous vein gives rise to multiple incompetent varicose veins the largest of which measures 3.4 mm of the mid calf and has a reflux time of 2722 ms.    On the left side the great saphenous vein is incompetent from the saphenofemoral junction to the knee.  It measures 7.1 mm at the saphenofemoral junction and 5.5 mm at the knee.  It gives rise to multiple incompetent varicose veins, the largest of which measures 3.1 mm of the proximal calf that courses medial with a reflux time of 6635 ms.    The visualized segments of the small saphenous vein are competent.    Plan: I explained the pathophysiology of disease to her in detail.  My recommendation would be to proceed with right anterior accessory saphenous vein ablation with medically necessary phlebectomies.  Although the great saphenous vein has incompetence in the right thigh it is not directly giving rise to the varicosities and I therefore do not think an ablation is advised.  On the left side the varicosities are actually arising from the great saphenous vein and I would advise left great saphenous vein  ablation and stab phlebectomies.    The plan was discussed with her in detail.  We will submit this to her insurance company.            Again, thank you for allowing me to participate in the care of your patient.        Sincerely,        Trung Mullen MD

## 2023-11-09 NOTE — PROGRESS NOTES
It was a pleasure to see Johanna Dominique again in the clinic today.    She is a 52-year-old female who I had seen previously for evaluation of bilateral lower extremity discomfort.  She tells me she has had a large cluster of varicose veins in the right lower extremity and a smaller one in the left lower extremity for about 4 years.  This has been causing significant discomfort with pain, heaviness, burning and pruritus.  She tells me that she used to be overweight and then she started taking her health more seriously and over the last few years has gone on to lose about 70 pounds of weight with daily exercise healthier eating and a calorie deficit diet.  Incidentally she also had an increase in the dose of Ozempic to control her diabetes.  With the very active lifestyle that she has now adopted the symptoms of the varicose veins have actually gotten worse.  Over the past 1 year or so she started wearing compression stockings to minimize the symptoms with the results have been not very rewarding for her.  Nevertheless she continues her active lifestyle and request for continued weight loss.     Specifically for symptoms she experiences pain as the day progresses.  She has also been doing intermittent leg elevation.  She used to work in [employer] as a manager.     No significant family history.     On examination: She has a large cluster of varicose veins in the right thigh going down to the knee and over the lateral leg.  The smaller cluster on the left side.     No changes of chronic venous insufficiency.     Diagnosis: Symptomatic, lifestyle limiting bilateral lower extremity, right greater than left varicose veins.    She got sonography of bilateral lower extremities.    On the right side the great saphenous vein is incompetent from the saphenofemoral junction to the distal thigh.  The great saphenous vein gives rise to a varicose branch measuring 2.2 mm of the proximal calf that courses medial and has a reflux time of  1452 ms.    The majority of the symptomatic veins are actually located in the anterior and lateral thigh that then coursed down over the lateral aspect of the knee and into the calf.  These are actually arising from the anterior accessory saphenous vein which is incompetent.  It measures 5.9 mm at that level.  It has a reflux time of 3365 ms.  The anterior accessory saphenous vein gives rise to a varicose vein branch measuring 4.5 mm of the proximal thigh 9 cm below the saphenofemoral junction.  This courses anterolaterally and has a reflux time of 957 ms.  The small saphenous vein is also showing segmental incompetence.  The saphenous popliteal junction itself is competent.  The small saphenous vein is incompetent from the mid calf to distal calf with a reflux time of 4108 ms.  The small saphenous vein gives rise to multiple incompetent varicose veins the largest of which measures 3.4 mm of the mid calf and has a reflux time of 2722 ms.    On the left side the great saphenous vein is incompetent from the saphenofemoral junction to the knee.  It measures 7.1 mm at the saphenofemoral junction and 5.5 mm at the knee.  It gives rise to multiple incompetent varicose veins, the largest of which measures 3.1 mm of the proximal calf that courses medial with a reflux time of 6635 ms.    The visualized segments of the small saphenous vein are competent.    Plan: I explained the pathophysiology of disease to her in detail.  My recommendation would be to proceed with right anterior accessory saphenous vein ablation with medically necessary phlebectomies.  Although the great saphenous vein has incompetence in the right thigh it is not directly giving rise to the varicosities and I therefore do not think an ablation is advised.  On the left side the varicosities are actually arising from the great saphenous vein and I would advise left great saphenous vein ablation and stab phlebectomies.    The plan was discussed with her in  detail.  We will submit this to her insurance company.

## 2023-11-09 NOTE — PROGRESS NOTES
November 9, 2023    Vein Procedure Recommendation    Spoke with patient in clinic.    Dr. Mullen has recommended patient to have the following vein procedure(s):     1. Right leg VNUS closure ASV and 1 unit Phlebectomies (medically necessary)    2. Left leg VNUS closure GSV and 0.5 unit Phlebectomies (medically necessary)    If possible with tumescent amount, OK for pt to have both legs done same day per Dr. Mullen.     Patient Pre-op Questions:  Preferred Pharmacy: [facility] in [facility]  Anticoagulant/ASA: Yes, ASA 81mg - pt will hold x 3 days prior  Artificial Joint or Heart Valve:  No  Open ulcer:  No  Sedation nausea: No    Patient is recommended to wear Thigh High compression hose following her procedure. Discussed compression hose. Pt purchased thigh high hose from clinic today.    Handed patient written procedure instructions to review on her own (see After Visit Summary).    Next steps:    Pre-scheduled pt today for Tuesday 12/12 at 0730 (0630 check in) with Dr. Mullen. (See appt desk) Because it is only 4 weeks from when Fariha Talamantes, surgery scheduler will be submitting to pt's insurance, pt is fully aware if approval is not rec'd in time, her procedure may need to be cancelled. Pt also aware her left leg may need to be done another day if right leg requires too much Tumescent. Per Dr. Mullen, will try to do both legs on same day.    Insurance Submission  Informed patient this process could take up to 14 business days, but once approved, the patient will be contacted by our surgery scheduler to schedule the above procedure. Gave patient our surgery scheduler's information.    Patient is in agreement with all of the above and has no further questions at this time.    Gayathri Gale RN  [facility]  Vein Clinic

## 2023-11-09 NOTE — PATIENT INSTRUCTIONS
Pre-Procedure Instructions:                           VNUS Closure and Phlebectomies   You are having a Closure(s) - where one or more veins are closed and Phlebectomies - where one or more veins are removed.   Insurance  Precertification and/or referral authorization may be required by your insurance company.  We will call your insurance company to verify benefits for the medically necessary part of your procedure.    Your Current Medications and Allergies  To reduce bruising, please do not take aspirin-type medications (Motrin, Aleve, Ibuprofen, Advil, etc.) for three days before your procedure. You may take Tylenol if you need a pain reliever.  Are you on blood thinner medications? (Plavix, Coumadin, Eliquis, Xarelto) Please discuss this with your surgeon. You may resume taking your blood thinner medication after your procedure.  Are you sensitive to latex or adhesives used for fake fingernails? Please let us know!    Driving Escort and   Please arrange to have a trusted adult (18 years old or older) drive you to and from the clinic.  For your safety, we recommend you have a trusted adult to stay with you until the next morning.    Your Health  If you have a change in your health before the procedure, contact our office immediately. (For example: cold symptoms, cough, urinary tract infection, fever, flu symptoms.)  A pre-procedure physical is not required.    Note  It is sometimes necessary to adjust the procedure schedule due to emergencies. We greatly appreciate your flexibility and understanding in this matter.                  Check List: The Morning of Your Procedure  ___1. Please do not put anything on your leg(s) or shave the day of your procedure.  ___2. You may take your normal medications the day of your procedure.  ___3. It is recommended you eat a light breakfast or lunch the day of your procedure.  ___4. Wear comfortable loose-fitting clothing and wide-fitting shoes (i.e. tennis shoes,  slip-ons).  ___5. Please arrive at our clinic at the specified time given by the nurse.  ___6. You will sign an affirmation of informed consent.  ___7. Bring your pre-procedure sedation medication (lorazepam and clonidine) with you to the clinic.              One hour before your procedure, you will be instructed to take these medications. The lorazepam              (Ativan) lowers anxiety and sedates you; the clonidine makes the lorazepam more effective. Everyone's              body processes these medications differently. Therefore, reactions to these medications vary. Some              people stay awake and some people sleep through the whole procedure. You may not remember              everything about the procedure or the day. You do not want to make any big decisions for the rest of the              day.    The Day of Your Procedure:       VNUS Closure and Phlebectomies  In the Exam Room  A nurse will bring you back to an exam room with your family member or friend. This is when your informed consent will be signed, and you will take your pre-procedure medications.  You will be asked to remove everything from the waist down, including undergarments. You will then put on a hospital gown or shorts and blue booties.  Your surgeon will come in to answer any questions and cece any bulging varicose veins to be removed.  You will be taken to the restroom to empty your bladder before going into the procedure room.    In the Procedure Room  You will be escorted to the procedure room. You will lie on a procedure table covered with a sheet or blanket.  A nurse will put a blood pressure cuff on your arm and a pulse/oxygen monitor on a finger. Your vital signs will be monitored every 15 minutes.  Your gown will be pulled up slightly and the groin exposed for a short period of time. The surgeon's assistant will clean your foot, leg, and groin with an antibacterial solution. We will get you covered up as quickly as  possible!  Sterile towels and blue drapes will be used to cover you and the table. You will be asked to keep your hands under the blue drapes during the procedure.  The lights will be turned down. The table will be tipped so your head is higher than your feet. You may feel like you're going to slide off, but you won't.    The Procedure  The surgeon will visualize your veins with an ultrasound machine. He or she will then numb your skin and access the vein. A catheter is passed up the vein and positioned with ultrasound guidance. The table will then be tipped head down.  Once the catheter is in the correct position, medication will be injected to numb your leg. You will feel some needle sticks and may feel discomfort as the medication goes in. Once this is done, you should not experience significant discomfort. But if you do, please let us know and more numbing medication can be injected. As the catheter sends out heat, the vein closes off and the catheter is withdrawn.  For the phlebectomy part of the procedure, small incisions are made where the bulging varicose veins have been marked on your leg(s) and these veins will be removed using a small eva hook instrument.    Post-Procedure  Once the procedure is done, your leg(s) will be washed with warm water and dried. Your leg(s) will be bandaged with large soft dressings and a large ACE bandage wrapped from toes to groin.   You will be offered something to drink and a light snack.  You will rest with your leg(s) elevated for approximately 30 minutes. Your friend or family member may join you.  For your safety, you will be taken to your car in a wheelchair. If you are able to, it is good to keep your leg(s) elevated on the car ride home.    Post-Procedure Instructions:             VNUS Closure and Phlebectomies      Post-Op Day Zero - The Day of Your Procedure:0  1. Medication for Pain Control and Inflammation Control   - The numbing medication injected during your  procedure will last for several hours. The pre-procedure                 tablets may make you very sleepy and you might not remember everything from the procedure or from                 the day. This will usually wear off by the next day.   - Ibuprofen:  If tolerated, take ibuprofen (e.g., Advil) to reduce inflammation whether or not you have                 pain. For three days, take two tablets (200 mg each) with every meal and at bedtime with a snack. If                 your pain is not controlled with ibuprofen, you may take prescription pain medication (such as Norco),                 if prescribed.   - You may resume taking any medications you were taking before your procedure.  2. Activity   - Rest with your leg(s) elevated above your heart. This will prevent from a lot of swelling and                 bleeding. You do not need to elevate your leg(s) while sleeping at night. You may go upstairs, sit up to                 eat, use the bathroom, and take several five-minute walks. Otherwise, keep your leg(s) elevated.                 Minimize the amount of time you are up on your feet to about 30 minutes at a time.  3. Bandages   - The incision sites will be covered with soft bandages and an ACE wrap. Keep your bandages on                 and dry for 48 hours. The ACE should provide  snug  compression but should not cause pain or                 numbness in the toes. If you have significant discomfort or your toes become cold or numb, unwrap                 your ACE and rewrap with less tension starting at the toes wrapping upward.  4. Incisions   - Bleeding: You may see some incision sites that are oozing through the bandages. This is not unusual      and can be managed with Rest, Ice, Compression and Elevation (RICE). Apply ice and firm pressure      directly to the site that is bleeding and rest with your leg(s) elevated above your heart for 20-30                 minutes.    Post-Op Day One:  1.  Medication   - Ibuprofen: Continue the same as the Day of Your Procedure. If your pain is not controlled with                 ibuprofen, you may take prescription pain medication (such as Norco), if prescribed.   2. Activity   - We would like you to get up at least six times and walk around for short periods of time, unless it is      causing you pain. You should not be on your feet more than 90 minutes at a time. Elevate your leg      above your heart when you are not walking.  3. Bandages   - Your bandages must be kept on and dry for 48 hours.  4. Driving   - You may resume driving when you can do so safely. Do not drive if you are taking narcotic pain      medication.  Post-Op Day Two:   1. Medication  - Ibuprofen: Continue the same as the Day of Your Procedure.  2.  Activity  - Walk as tolerated. Elevate as much as possible when not walking.  3. Bandages and Compression  - Remove ACE wrap and padding. Shower and put on your compression hose during waking hours only       for at least 5 days. (Your doctor may instruct you to keep your bandages on until your return     appointment; please follow your doctor's instructions.)  4. Incisions  - Your leg(s) will be bruised; there may be swelling, hard knots under the skin and possibly some     numbness. These will likely resolve over time. If you see  hair-like  strings coming out of your     incisions, do not pull them (this will only cause pain/discomfort). We will trim them when you come     back for your follow-up appointment.  5. Call Us If:   - You see any areas on your leg that are red and angry in appearance.   - You notice any drainage that is milky or cloudy in appearance or that has a foul odor.   - You run a temperature of 100.5 or greater.    Post-Op Day Three:  You will have a follow up appointment 2-4 days post-procedure. At this appointment, you will have an ultrasound and we will check your incisions.     ________________________________________________________________________________________    The Two Weeks Following Your Procedure  1.  Skin Care   - Do not use any lotions, creams or powders on your incisions for 14 days or until the incisions have                 healed.   - Do not soak in a bathtub, hot tub or go swimming for 14 days or until your incisions have healed.  2.  Medications   - You may use ibuprofen or acetaminophen (e.g., Tylenol) as needed for pain or discomfort.  3.  Activity   - Do not lift over 25 pounds. After about two weeks you may resume exercise such as aerobics,                 running, tennis or weightlifting. Use your common sense and ease back into your exercise routine                 slowly.   - You may feel a cord-like tightness along the inside of your leg. Gentle stretching can be helpful.  4. Compression Hose   - Your doctor may instruct you to wear compression for longer than seven days; please follow your                 doctor's instructions. As a comfort measure, you may choose to wear compression for longer than                 required.  5.  Travel   - Do not fly in an airplane for 14 days after your procedure. If you have a long car trip planned within                 two to three weeks following your procedure, stop and walk for a few minutes every two hours.      Periodic ankle pumps during the ride may be helpful.    Six Week Appointment  - At your six-week appointment, you will see your surgeon for an exam and evaluation. This office visit     will be scheduled when you return for post-op day three return appointment.       Return to Work  1.  If you work outside the home, you may return to work in a few days depending on the extent of your        procedure, how you tolerate it, and the type of work you perform.  2.  Paperwork: If your employer requires paperwork or you would like a letter written to your employer, please        let us know. We will complete  disability type forms at no charge. Please allow five business days for forms        to be completed.

## 2023-11-27 ENCOUNTER — LAB REQUISITION (OUTPATIENT)
Dept: LAB | Facility: CLINIC | Age: 53
End: 2023-11-27

## 2023-11-27 DIAGNOSIS — R53.83 OTHER FATIGUE: ICD-10-CM

## 2023-11-27 DIAGNOSIS — E55.9 VITAMIN D DEFICIENCY, UNSPECIFIED: ICD-10-CM

## 2023-11-27 DIAGNOSIS — E11.9 TYPE 2 DIABETES MELLITUS WITHOUT COMPLICATIONS (H): ICD-10-CM

## 2023-11-27 LAB
IRON SERPL-MCNC: 74 UG/DL (ref 37–145)
VIT B12 SERPL-MCNC: 391 PG/ML (ref 232–1245)
VIT D+METAB SERPL-MCNC: 30 NG/ML (ref 20–50)

## 2023-11-27 PROCEDURE — 83540 ASSAY OF IRON: CPT | Performed by: PHYSICIAN ASSISTANT

## 2023-11-27 PROCEDURE — 82306 VITAMIN D 25 HYDROXY: CPT | Performed by: PHYSICIAN ASSISTANT

## 2023-11-27 PROCEDURE — 82607 VITAMIN B-12: CPT | Performed by: PHYSICIAN ASSISTANT

## 2024-02-04 ENCOUNTER — HEALTH MAINTENANCE LETTER (OUTPATIENT)
Age: 54
End: 2024-02-04

## 2024-02-06 ENCOUNTER — TELEPHONE (OUTPATIENT)
Dept: VASCULAR SURGERY | Facility: CLINIC | Age: 54
End: 2024-02-06

## 2024-02-06 DIAGNOSIS — I83.813 VARICOSE VEINS OF BOTH LOWER EXTREMITIES WITH PAIN: Primary | ICD-10-CM

## 2024-02-06 NOTE — TELEPHONE ENCOUNTER
2/6/2024    Vein Clinic Preoperative Nurse Call    Procedure: Right leg VNUS closure ASV and 1 unit phlebs (med nec), Left leg VNUS closure GSV and 0.5 unit phlebs (med nec).   Date: Tuesday 2/13/24  Surgeon: Dr. Mullne  Time: 0730  Check in time: 0630    Called and spoke to patient. Informed patient: when to check in (0630) to sign consent, to bring their preop medications in their original bottle with them (3mg ativan, 0.1mg clonidine). Patient will take the medications after signing the consent to the procedure. Instructed patient to wear loose-fitting comfortable clothing, and bring their compression hose. Ensured patient has a /someone that will be responsible for them the rest of the day. Once procedure is completed, we will keep patient in recovery for 30-45 mins, and call  with aftercare instructions. Informed patient, that if possible, they should sit in the backseat to elevate their leg on the ride home.    Pt needs Thigh High compression hose for procedure. Status of the hose: patient has thigh high compression hose.    Special instructions: Patient instructed to hold baby aspirin for 5 days but she states she is planning to  hold her aspirin starting 2/8. Patient has a flight in March over two weeks from the procedure and I reassured her that it is ok to fly but to plan to wear her compression hose and to plan to walk around the plane if it is a long flight.     Patient understands if they have any of the following symptoms (fever, cough, shortness of breath, rash), they need to notify us immediately as they may need to cancel their procedure and reschedule for a later date.    Patient is in agreement with all of the above and has no further questions at this time.    Fariha Lopez RN  [facility]in [facility]

## 2024-02-07 RX ORDER — CLONIDINE HYDROCHLORIDE 0.1 MG/1
TABLET ORAL
Qty: 1 TABLET | Refills: 0 | Status: SHIPPED | OUTPATIENT
Start: 2024-02-07 | End: 2024-02-15

## 2024-02-07 RX ORDER — LORAZEPAM 1 MG/1
TABLET ORAL
Qty: 3 TABLET | Refills: 0 | Status: SHIPPED | OUTPATIENT
Start: 2024-02-07 | End: 2024-02-15

## 2024-02-13 ENCOUNTER — OFFICE VISIT (OUTPATIENT)
Dept: VASCULAR SURGERY | Facility: CLINIC | Age: 54
End: 2024-02-13
Payer: COMMERCIAL

## 2024-02-13 VITALS — OXYGEN SATURATION: 97 % | HEART RATE: 63 BPM | SYSTOLIC BLOOD PRESSURE: 115 MMHG | DIASTOLIC BLOOD PRESSURE: 63 MMHG

## 2024-02-13 DIAGNOSIS — I83.813 VARICOSE VEINS OF LOWER EXTREMITY WITH PAIN, BILATERAL: Primary | ICD-10-CM

## 2024-02-13 PROCEDURE — 36475 ENDOVENOUS RF 1ST VEIN: CPT | Mod: LT | Performed by: SURGERY

## 2024-02-13 PROCEDURE — 37766 PHLEB VEINS - EXTREM 20+: CPT | Mod: 50 | Performed by: SURGERY

## 2024-02-13 RX ORDER — HYDROCODONE BITARTRATE AND ACETAMINOPHEN 5; 325 MG/1; MG/1
1 TABLET ORAL EVERY 6 HOURS PRN
Qty: 20 TABLET | Refills: 0 | Status: SHIPPED | OUTPATIENT
Start: 2024-02-13 | End: 2024-02-15

## 2024-02-13 NOTE — PROGRESS NOTES
Vein Clinic Procedure Note    Preoperative diagnosis:    1.  Bilateral lower extremity varicose veins with pain with failure of conservative management.  2.  Right anterior accessory saphenous vein incompetence.  3.  Left great saphenous vein incompetence.    Post operative diagnosis:  Same    Procedure:  1.  Attempted (aborted) right anterior accessory saphenous vein radiofrequency ablation.  2.  Left great saphenous vein radiofrequency ablation.  3.  Bilateral lower extremity medically necessary stab phlebectomies (35 on the right and 29 on the left.    Preoperative medications: 3 mg ativan, 0.1 mg clonidine      Procedures    Operative description  Indications: This is a 53-year-old female with bilateral lower extremity varicose veins which are lifestyle limiting.  She has failed conservative management.  She has anterior accessory saphenous vein incompetence which is giving rise to the varicose veins in the right thigh and lower extremity.  On the left side she has left great saphenous vein incompetence which is giving rise to the symptomatic varicose veins.  We are advising right anterior accessory saphenous vein ablation and left great saphenous vein ablation along with bilateral lower extremity medically necessary stab phlebectomies.    Procedure: Patient was identified and then taken to the procedure room and placed in supine position.  Both lower extremities were prepped and a sterile surgical field was created.  Preprocedure timeout was conducted.    I started with mapping the right anterior accessory saphenous vein in the anterior thigh.  It was located in the anterior thigh.  Local anesthetic was administered and multiple attempts were made to access it.  The vein Rolling and at 1 point went into spasm without further visualization and I will this point I decided to abort attempting to access it.  Then attention was turned to the left lower extremity and the left great saphenous vein was mapped out  from the knee to the groin.  Local anesthetic was administered on the medial side of the left knee and the vein was accessed with a micropuncture needle followed by placement of a microwire.  Short 7 Azeri sheath was placed.  Radiofrequency ablation catheter was advanced to the saphenofemoral junction and pulled back 2.3 cm.  Generous tumescent anesthetic was administered in the left great saphenous vein compartment.  The previously marked varicose veins were anesthetized with tumescent anesthetic in both lower extremities.    Please get standard radiofrequency ablation of the left great saphenous vein and multiple stab phlebectomies of both lower extremity varicose veins.    Adequate hemostasis was noted.  Sterile compressive dressing was applied.    VNUS: Left great saphenous vein.    Stab phlebectomy: Medically necessary, 35 on the right and 29 on the left.    EBL: 5 mL        2/13/2024     7:39 AM   Flowsheet Data   Procedure Start Time: 07:39   Prep: Chloraprep   Side: Bilateral   Tx Length (cm): LEFT GSV: 38   Junction (cm): LEFT GSV: 2.28   RF Cycles: LEFT GSV: 9   RF TX Time (Minutes): LEFT GSV: 2:57   # PHLEB Sites: RIGHT: 35 LEFT: 29   Sedation taken: Yes   Pre Pt. Physical / Cognitive Limitations: WNL   TOTAL Local anesthesia Injected (ml): 6   Max Volume Local Anesthesia (ml): 11   TOTAL Tumescent Injected volume (ml): 568   Max Volume Tumescent (ml): 572   Post Pt. Physical / Cognitive Limitations: WNL   Procedure End Time: 09:02   D/C Instructions given, states readiness to leave and escorted to car: Yes       Patient's blood pressure, pulse and pulse oximetry were continuously monitored throughout the procedure under my direct supervision and was stable during the procedure.    Patient recovered in our suites and discharged home with their family with postoperative instructions and follow up.     Trung Mullen MD

## 2024-02-13 NOTE — PROGRESS NOTES
Pre-procedure Nursing Note    Johanna Dominique presents to clinic for Vein Procedure  .   /Person Responsible for Patient: Dario (Significant Other)  Phone Number: [phone]    Prophylactic Medication:N/A   Sedation Medication: Ativan, 3mg ,   Time Taken: 0638 and Clonidine, 0.1mg,   Time Taken: 0638  Compression Stockings: Patient brought with today.  The procedure is being performed on BLE.  Patient understanding of procedure matches consent? YES    Patient's pre-procedure medications verified by Fariha Lopez RN .    Fariha Lopez RN on 2/13/2024 at 6:35 AM

## 2024-02-13 NOTE — LETTER
2/13/2024         RE: Jhoanna Dominique  [address], [place] [address]        Dear Colleague,    Thank you for referring your patient, Johanna Dominique, to the [facility] [place]. Please see a copy of my visit note below.    Pre-procedure Nursing Note    Johanna Dominique presents to clinic for Vein Procedure  .   /Person Responsible for Patient: Dario (Significant Other)  Phone Number: [phone]    Prophylactic Medication:N/A   Sedation Medication: Ativan, 3mg ,   Time Taken: 0638 and Clonidine, 0.1mg,   Time Taken: 0638  Compression Stockings: Patient brought with today.  The procedure is being performed on BLE.  Patient understanding of procedure matches consent? YES    Patient's pre-procedure medications verified by Fariha Lopez RN .    Fariha Lopez RN on 2/13/2024 at 6:35 AM      Again, thank you for allowing me to participate in the care of your patient.        Sincerely,        Trung Mullen MD

## 2024-02-15 ENCOUNTER — ALLIED HEALTH/NURSE VISIT (OUTPATIENT)
Dept: VASCULAR SURGERY | Facility: CLINIC | Age: 54
End: 2024-02-15
Attending: SURGERY
Payer: COMMERCIAL

## 2024-02-15 ENCOUNTER — ANCILLARY PROCEDURE (OUTPATIENT)
Dept: ULTRASOUND IMAGING | Facility: CLINIC | Age: 54
End: 2024-02-15
Attending: SURGERY
Payer: COMMERCIAL

## 2024-02-15 DIAGNOSIS — I83.813 VARICOSE VEINS OF BOTH LOWER EXTREMITIES WITH PAIN: ICD-10-CM

## 2024-02-15 DIAGNOSIS — Z09 POSTOP CHECK: Primary | ICD-10-CM

## 2024-02-15 PROCEDURE — 93971 EXTREMITY STUDY: CPT | Mod: LT | Performed by: SURGERY

## 2024-02-15 PROCEDURE — 99207 PR NO CHARGE NURSE ONLY: CPT

## 2024-02-15 NOTE — PROGRESS NOTES
February 15, 2024    Vein Clinic Postoperative Nurse Note    Patient is here for her 72 hour postoperative visit.    Procedure: Left leg VNUS closure GSV and 0.5 unit phlebs (med nec), 1 unit bilateral phlebs (med nec)   Procedure Date: 2/13/24  Surgeon: Dr. Mullen    Ultrasound Result: Negative for Left LE DVT, GSV closed 17.3 mm from SFJ to distal thigh.    Physical Exam: Incisions are approximated without signs of infection.  Ecchymosis: moderate bruising noted to access and phlebectomy sites  Swelling: none noted  Paresthesia: patient denies    Patient Questions or Concerns: answered all questions today.    Reviewed postoperative instructions with patient and provided her with written material of common things to expect from her procedure.    Patient's Next Vein Clinic Appointment: 3/28/24    Drake Richardson RN  [facility]

## 2024-02-21 ENCOUNTER — HOSPITAL ENCOUNTER (EMERGENCY)
Facility: CLINIC | Age: 54
Discharge: HOME | End: 2024-02-22
Attending: EMERGENCY MEDICINE
Payer: COMMERCIAL

## 2024-02-21 DIAGNOSIS — R07.9 ACUTE CHEST PAIN: ICD-10-CM

## 2024-02-21 DIAGNOSIS — I82.811 ACUTE SUPERFICIAL VENOUS THROMBOSIS OF RIGHT LOWER EXTREMITY: ICD-10-CM

## 2024-02-21 LAB
ANION GAP SERPL CALCULATED.3IONS-SCNC: 11 MMOL/L (ref 7–15)
APTT PPP: 26 SECONDS (ref 22–38)
ATRIAL RATE - MUSE: 61 BPM
BASOPHILS # BLD AUTO: 0.1 10E3/UL (ref 0–0.2)
BASOPHILS NFR BLD AUTO: 1 %
BUN SERPL-MCNC: 24.2 MG/DL (ref 6–20)
CALCIUM SERPL-MCNC: 9.2 MG/DL (ref 8.6–10)
CHLORIDE SERPL-SCNC: 103 MMOL/L (ref 98–107)
CREAT SERPL-MCNC: 0.66 MG/DL (ref 0.51–0.95)
D DIMER PPP FEU-MCNC: 0.51 UG/ML FEU (ref 0–0.5)
DEPRECATED HCO3 PLAS-SCNC: 28 MMOL/L (ref 22–29)
DIASTOLIC BLOOD PRESSURE - MUSE: NORMAL MMHG
EGFRCR SERPLBLD CKD-EPI 2021: >90 ML/MIN/1.73M2
EOSINOPHIL # BLD AUTO: 0.1 10E3/UL (ref 0–0.7)
EOSINOPHIL NFR BLD AUTO: 1 %
ERYTHROCYTE [DISTWIDTH] IN BLOOD BY AUTOMATED COUNT: 13.7 % (ref 10–15)
GLUCOSE SERPL-MCNC: 107 MG/DL (ref 70–99)
HCT VFR BLD AUTO: 44.9 % (ref 35–47)
HGB BLD-MCNC: 14.4 G/DL (ref 11.7–15.7)
IMM GRANULOCYTES # BLD: 0 10E3/UL
IMM GRANULOCYTES NFR BLD: 0 %
INR PPP: 0.97 (ref 0.85–1.15)
INTERPRETATION ECG - MUSE: NORMAL
LYMPHOCYTES # BLD AUTO: 3.5 10E3/UL (ref 0.8–5.3)
LYMPHOCYTES NFR BLD AUTO: 51 %
MCH RBC QN AUTO: 26.2 PG (ref 26.5–33)
MCHC RBC AUTO-ENTMCNC: 32.1 G/DL (ref 31.5–36.5)
MCV RBC AUTO: 82 FL (ref 78–100)
MONOCYTES # BLD AUTO: 0.5 10E3/UL (ref 0–1.3)
MONOCYTES NFR BLD AUTO: 8 %
NEUTROPHILS # BLD AUTO: 2.7 10E3/UL (ref 1.6–8.3)
NEUTROPHILS NFR BLD AUTO: 39 %
NRBC # BLD AUTO: 0 10E3/UL
NRBC BLD AUTO-RTO: 0 /100
NT-PROBNP SERPL-MCNC: <36 PG/ML (ref 0–900)
P AXIS - MUSE: 40 DEGREES
PLATELET # BLD AUTO: 252 10E3/UL (ref 150–450)
POTASSIUM SERPL-SCNC: 3.7 MMOL/L (ref 3.4–5.3)
PR INTERVAL - MUSE: 156 MS
QRS DURATION - MUSE: 88 MS
QT - MUSE: 418 MS
QTC - MUSE: 420 MS
R AXIS - MUSE: 39 DEGREES
RBC # BLD AUTO: 5.5 10E6/UL (ref 3.8–5.2)
SODIUM SERPL-SCNC: 142 MMOL/L (ref 135–145)
SYSTOLIC BLOOD PRESSURE - MUSE: NORMAL MMHG
T AXIS - MUSE: 31 DEGREES
TROPONIN T SERPL HS-MCNC: <6 NG/L
VENTRICULAR RATE- MUSE: 61 BPM
WBC # BLD AUTO: 6.9 10E3/UL (ref 4–11)

## 2024-02-21 PROCEDURE — 83880 ASSAY OF NATRIURETIC PEPTIDE: CPT | Performed by: EMERGENCY MEDICINE

## 2024-02-21 PROCEDURE — 85379 FIBRIN DEGRADATION QUANT: CPT | Performed by: EMERGENCY MEDICINE

## 2024-02-21 PROCEDURE — 84484 ASSAY OF TROPONIN QUANT: CPT | Performed by: EMERGENCY MEDICINE

## 2024-02-21 PROCEDURE — 85730 THROMBOPLASTIN TIME PARTIAL: CPT | Performed by: EMERGENCY MEDICINE

## 2024-02-21 PROCEDURE — 93005 ELECTROCARDIOGRAM TRACING: CPT | Performed by: EMERGENCY MEDICINE

## 2024-02-21 PROCEDURE — 99285 EMERGENCY DEPT VISIT HI MDM: CPT | Mod: 25

## 2024-02-21 PROCEDURE — 85610 PROTHROMBIN TIME: CPT | Performed by: EMERGENCY MEDICINE

## 2024-02-21 PROCEDURE — 85025 COMPLETE CBC W/AUTO DIFF WBC: CPT | Performed by: EMERGENCY MEDICINE

## 2024-02-21 PROCEDURE — 82374 ASSAY BLOOD CARBON DIOXIDE: CPT | Performed by: EMERGENCY MEDICINE

## 2024-02-21 PROCEDURE — 36415 COLL VENOUS BLD VENIPUNCTURE: CPT | Performed by: EMERGENCY MEDICINE

## 2024-02-21 ASSESSMENT — ENCOUNTER SYMPTOMS
ABDOMINAL PAIN: 0
DIARRHEA: 0
VOMITING: 0
NAUSEA: 0
PALPITATIONS: 0
FEVER: 0
SHORTNESS OF BREATH: 1
DYSURIA: 0
COUGH: 0

## 2024-02-21 NOTE — Clinical Note
Johanna Dominique was seen and treated in our emergency department on 2/21/2024.  She may return to work on 02/23/2024.       If you have any questions or concerns, please don't hesitate to call.      Leidy Trent MD

## 2024-02-22 ENCOUNTER — TELEPHONE (OUTPATIENT)
Dept: VASCULAR SURGERY | Facility: CLINIC | Age: 54
End: 2024-02-22
Payer: COMMERCIAL

## 2024-02-22 ENCOUNTER — APPOINTMENT (OUTPATIENT)
Dept: ULTRASOUND IMAGING | Facility: CLINIC | Age: 54
End: 2024-02-22
Attending: EMERGENCY MEDICINE
Payer: COMMERCIAL

## 2024-02-22 ENCOUNTER — APPOINTMENT (OUTPATIENT)
Dept: CT IMAGING | Facility: CLINIC | Age: 54
End: 2024-02-22
Attending: EMERGENCY MEDICINE
Payer: COMMERCIAL

## 2024-02-22 VITALS
HEART RATE: 56 BPM | DIASTOLIC BLOOD PRESSURE: 60 MMHG | WEIGHT: 189 LBS | TEMPERATURE: 97.7 F | OXYGEN SATURATION: 94 % | RESPIRATION RATE: 22 BRPM | SYSTOLIC BLOOD PRESSURE: 117 MMHG | BODY MASS INDEX: 30.51 KG/M2

## 2024-02-22 LAB — HOLD SPECIMEN: NORMAL

## 2024-02-22 PROCEDURE — 96360 HYDRATION IV INFUSION INIT: CPT | Mod: 59

## 2024-02-22 PROCEDURE — 93970 EXTREMITY STUDY: CPT

## 2024-02-22 PROCEDURE — 250N000013 HC RX MED GY IP 250 OP 250 PS 637: Performed by: EMERGENCY MEDICINE

## 2024-02-22 PROCEDURE — 71275 CT ANGIOGRAPHY CHEST: CPT

## 2024-02-22 PROCEDURE — 250N000011 HC RX IP 250 OP 636: Performed by: EMERGENCY MEDICINE

## 2024-02-22 PROCEDURE — 258N000003 HC RX IP 258 OP 636: Performed by: EMERGENCY MEDICINE

## 2024-02-22 RX ORDER — IOPAMIDOL 755 MG/ML
75 INJECTION, SOLUTION INTRAVASCULAR ONCE
Status: COMPLETED | OUTPATIENT
Start: 2024-02-22 | End: 2024-02-22

## 2024-02-22 RX ORDER — MAGNESIUM OXIDE 400 MG/1
400 TABLET ORAL ONCE
Status: COMPLETED | OUTPATIENT
Start: 2024-02-22 | End: 2024-02-22

## 2024-02-22 RX ORDER — POTASSIUM CHLORIDE 1500 MG/1
40 TABLET, EXTENDED RELEASE ORAL ONCE
Status: COMPLETED | OUTPATIENT
Start: 2024-02-22 | End: 2024-02-22

## 2024-02-22 RX ADMIN — IOPAMIDOL 75 ML: 755 INJECTION, SOLUTION INTRAVENOUS at 01:02

## 2024-02-22 RX ADMIN — POTASSIUM CHLORIDE 40 MEQ: 1500 TABLET, EXTENDED RELEASE ORAL at 02:03

## 2024-02-22 RX ADMIN — SODIUM CHLORIDE 500 ML: 9 INJECTION, SOLUTION INTRAVENOUS at 02:03

## 2024-02-22 RX ADMIN — Medication 400 MG: at 02:03

## 2024-02-22 NOTE — DISCHARGE INSTRUCTIONS
No signs of any blood clot in your lungs.  Heart blood test look great.  I have given you the potassium and magnesium minerals to see if that helps with the chest wall spasms.    You do have a superficial vein clot, likely this is all from the procedure and expected but I do recommend you touch base with your vein clinic tomorrow just to let them know.  You can read the ultrasound report off [facility] for them.    Return to emergency department if develop worsening chest pain, difficulty breathing, fevers, or any other concerns.    Thank you for choosing [facility] Emergency Department.  It has been my pleasure caring for you today.     ~Dr. Miley MD

## 2024-02-22 NOTE — ED TRIAGE NOTES
Pt states pain to left lateral anterior chest that started 1 hour ago. Describes as fast pain that is quickly present and quickly goes away. Some sob reported.     Had procedure Tuesday last week to remove verucose veins to bilat legs and ablation done to great saphenous vein left leg.   Denies light headedness nor dizziness. Denies nausea.

## 2024-02-22 NOTE — TELEPHONE ENCOUNTER
Patient is calling the clinic to inform us of a superficial thrombosis diagnosed in the ER 2/21/24. She had initially gone in for 'very small' intermittent chest pain lasting for one hour. All cardiac testing was negative.     I spoke with Dr Mullen in regards to this finding. He states that this is not entirely unexpected and likely related to her phlebectomies. Dr Mullen recommended that the patient take 325mg of aspirin daily for 2 weeks to help break up the superficial thrombosis.     I spoke with Johanna and reassured her of Dr Mullen's opinion and passed on his recommendation to take 325mg of aspirin daily.     I reviewed with Johanna symptoms which she should watch for in the postoperative period including: swelling, heat or redness of legs, pain especially to calf, trouble bearing weight on her legs. I reviewed with her that she would likely start to develop tight pain in her inner left thigh related to GSV closure and that this was an expected finding.    I encouraged her to reach out to us any time with additional or increased symptoms.     Patient is in agreement with plan and had no further questions. She has a follow up previously scheduled for March 28th.    Fariha Lopez RN

## 2024-02-22 NOTE — ED PROVIDER NOTES
EMERGENCY DEPARTMENT ENCOUNTER      NAME: Johanna Dominique  AGE: 53 year old female  YOB: 1970  MRN: [id number]  EVALUATION DATE & TIME: No admission date for patient encounter.    PCP: Bhavya Fontana    ED PROVIDER: Leidy Trent M.D.        Chief Complaint   Patient presents with    Chest Pain    Shortness of Breath         FINAL IMPRESSION:    1. Acute chest pain    2. Acute superficial venous thrombosis of right lower extremity            MEDICAL DECISION MAKING:    Johanna Dominique is a 53 year old female with history of MO, obesity, diabetes, hypertension, recent varicose vein procedure to bilateral legs, who presents to the ER with complaints of chest pain and a little shortness of breath.     No signs for PE or other concerning lung pathology on imaging.  Laboratories reassuring.  Her chest pain seems more muscular in nature.  Will give her an oral dose of potassium and magnesium prior to discharge.  Superficial anterior thigh thrombus seen on ultrasound.  No deep venous thrombosis.  Will have her discuss this with her pain clinic tomorrow but likely at the site of aborted varicose abrasion.  She does not appear toxic or septic.  No indication for admission to the hospital.  Patient feels comfortable discharge home and all of her questions have been answered.  She understands what to watch for and when to return to the ER.      ED COURSE:  10:56 PM  I met with the patient in triage room to gather history and perform my exam. ED course and treatment discussed.    1:55 AM  Patient was updated on results.  She was able to see them on [facility] as well.  She feels comfortable with plan for discharge home.  She will touch base with her vein clinic in the morning to let them know about this superficial vein thrombosis but likely due to the described aborted procedure from the vein clinic.  She does not appear toxic or septic.  No signs for PE at this time.  Patient understands what to watch for  and when to return to ER and all of her questions have been answered.  On exam she does have a lot of bruising in this area of presumed superficial thrombus.  No signs for infection.    I do not think that this represents rib fractures, myocarditis, pericarditis, endocarditis, ACS, PE, ruptured AAA, pneumothorax, aortic dissection, bowel obstruction, bowel ischemia, cholecystitis, kidney stone, pyelonephritis, or other such etiologies at this time.  At this time I feel that this patient can be discharged from the emergency department and can followup as directed.    At the conclusion of the encounter I discussed the results of all of the tests and the disposition. Their questions were answered. The patient (and any family present) acknowledged understanding and were agreeable with the care plan.      CONSULTANTS:  none    MEDICATIONS GIVEN IN THE EMERGENCY:  Medications   sodium chloride 0.9% BOLUS 500 mL (0 mLs Intravenous Stopped 2/22/24 0234)   iopamidol (ISOVUE-370) solution 75 mL (75 mLs Intravenous $Given 2/22/24 0102)   potassium chloride kai ER (KLOR-CON M20) CR tablet 40 mEq (40 mEq Oral $Given 2/22/24 0203)   magnesium oxide (MAG-OX) tablet 400 mg (400 mg Oral $Given 2/22/24 0203)           NEW PRESCRIPTIONS STARTED AT TODAY'S ER VISIT     Medication List      There are no discharge medications for this visit.             CONDITION:  stable        DISPOSITION:  D.c home         =================================================================  =================================================================  TRIAGE ASSESSMENT:  Pt states pain to left lateral anterior chest that started 1 hour ago. Describes as fast pain that is quickly present and quickly goes away. Some sob reported.     Had procedure Tuesday last week to remove verucose veins to bilat legs and ablation done to great saphenous vein left leg.   Denies light headedness nor dizziness. Denies nausea.        ED Triage Vitals [02/21/24 2214]    Enc Vitals Group      BP (!) 155/71      Pulse 57      Resp 12      Temp 97.7  F (36.5  C)      Temp src Oral      SpO2 97 %      Weight 85.7 kg (189 lb)          ================================================================  ================================================================    HPI    Patient information was obtained from: patient    Use of Intrepreter: N/A     Johanna Dominique is a 53 year old female with history of MO, obesity, diabetes, hypertension, recent varicose vein procedure to bilateral legs, who presents to the ER with complaints of chest pain and a little shortness of breath.    She has been complaining of some episodes of chest discomfort that lasts a few seconds but happening several times an hour.  Denies any reproducibility with palpation to the chest.  Felt a little short of breath with this earlier though this has improved.  She is wondering if maybe it was a little anxiety.  She had recent varicose vein procedure and is worried about DVT and PE.    Otherwise denies fevers, cough, abdominal pain, vomiting or diarrhea.  She denies any concerns for infection of her legs or in recent varicose vein procedure.      CHART REVIEW:  Chart review shows that she had vein procedure on February 13, 2024 which includes VNUS and phlebectomies    Vein Clinic Procedure Note     Preoperative diagnosis:    1.  Bilateral lower extremity varicose veins with pain with failure of conservative management.  2.  Right anterior accessory saphenous vein incompetence.  3.  Left great saphenous vein incompetence.     Post operative diagnosis:  Same     Procedure:  1.  Attempted (aborted) right anterior accessory saphenous vein radiofrequency ablation.  2.  Left great saphenous vein radiofrequency ablation.  3.  Bilateral lower extremity medically necessary stab phlebectomies (35 on the right and 29 on the left.      REVIEW OF SYSTEMS  Review of Systems   Constitutional:  Negative for fever.   Respiratory:   Positive for shortness of breath. Negative for cough.    Cardiovascular:  Positive for chest pain. Negative for palpitations.   Gastrointestinal:  Negative for abdominal pain, diarrhea, nausea and vomiting.   Genitourinary:  Negative for dysuria.   All other systems reviewed and are negative.        PAST MEDICAL HISTORY:  Past Medical History:   Diagnosis Date    Allergic state     Diabetes (H)     Hypertension 2010         PAST SURGICAL HISTORY:  Past Surgical History:   Procedure Laterality Date    GYN SURGERY  2009    Hysterectomy    HIP SURGERY  1983,1981,1980    Hip Pinning    SINUS SURGERY  1999         CURRENT MEDICATIONS:    Prior to Admission medications    Medication Sig Start Date End Date Taking? Authorizing Provider   aspirin (ASA) 81 MG EC tablet TAKE 1 TAB BY MOUTH DAILY. 3/30/20   Reported, Patient   hydrochlorothiazide (HYDRODIURIL) 25 MG tablet Take 25 mg by mouth daily.    Reported, Patient   JARDIANCE 25 MG TABS tablet Take 25 mg by mouth daily 11/2/20   Reported, Patient   naloxone (NARCAN) 4 MG/0.1ML nasal spray Spray 1 spray (4 mg) into one nostril alternating nostrils as needed for opioid reversal every 2-3 minutes until assistance arrives 2/13/24   Trung Mullen MD   ORDER FOR DME Please issue a cpap machine set at 15cmH2 w heated humidifier and nasal mask 12/23/11   DincerUlysses MD   OZEMPIC, 2 MG/DOSE, 8 MG/3ML pen INJECT 2MG UNDER THE SKIN ONCE WEEKLY 8/28/23   Reported, Patient   rosuvastatin (CRESTOR) 10 MG tablet take 1 tablet by mouth every day for 90 days    Reported, Patient   TRESIBA FLEXTOUCH 200 UNIT/ML pen  12/6/20   Reported, Patient         ALLERGIES:  Allergies   Allergen Reactions    Nkda [No Known Drug Allergy]          FAMILY HISTORY:  History reviewed. No pertinent family history.      SOCIAL HISTORY:  Social History     Socioeconomic History    Marital status: Single     Spouse name: None    Number of children: None    Years of education: None     Highest education level: None   Tobacco Use    Smoking status: Former     Packs/day: 1.00     Years: 6.00     Additional pack years: 0.00     Total pack years: 6.00     Types: Cigarettes    Smokeless tobacco: Never    Tobacco comments:     Info on smoking cessation given.   Substance and Sexual Activity    Alcohol use: No    Drug use: No         VITALS:  Patient Vitals for the past 24 hrs:   BP Temp Temp src Pulse Resp SpO2 Weight   02/22/24 0155 117/60 -- -- 56 -- 94 % --   02/22/24 0140 -- -- -- 57 -- 94 % --   02/22/24 0125 127/68 -- -- 62 -- 95 % --   02/22/24 0110 (!) 140/68 -- -- 66 -- 95 % --   02/21/24 2355 115/56 -- -- 68 -- 94 % --   02/21/24 2336 -- -- -- 56 22 98 % --   02/21/24 2335 -- -- -- 59 30 98 % --   02/21/24 2334 121/56 -- -- 59 24 100 % --   02/21/24 2214 (!) 155/71 97.7  F (36.5  C) Oral 57 12 97 % 85.7 kg (189 lb)       Wt Readings from Last 3 Encounters:   02/21/24 85.7 kg (189 lb)   03/12/23 113.4 kg (250 lb)   12/24/18 119.6 kg (263 lb 9.6 oz)       Estimated Creatinine Clearance: 106.3 mL/min (based on SCr of 0.66 mg/dL).    PHYSICAL EXAM    Constitutional:  Well developed, Well nourished, NAD  HENT:  Normocephalic, Atraumatic, Bilateral external ears normal, Nose normal. Neck- Supple, No stridor.   Eyes:  PERRL, EOMI, Conjunctiva normal, No discharge.  Respiratory:  Normal breath sounds, No respiratory distress, No wheezing, Speaks full sentences easily. No cough.   Cardiovascular:  Normal heart rate, Regular rhythm, No murmurs, No rubs, No gallops. Chest wall nontender.   GI:  No excessive obesity.  Bowel sounds normal, Soft, No tenderness, No masses, No flank tenderness. No rebound or guarding.   : deferred  Musculoskeletal: 2+ DP pulses. No cyanosis, No clubbing. Good range of motion in all major joints. No major deformities noted.  Integument:  Warm, Dry, No erythema, No rash.  No petechiae.   Neurologic:  Alert & oriented x 3, Normal gait.   Psychiatric:  Affect normal,  Cooperative         LAB:  All pertinent labs reviewed and interpreted.  Recent Results (from the past 24 hour(s))   ECG 12-LEAD WITH MUSE (LHE)    Collection Time: 02/21/24 10:20 PM   Result Value Ref Range    Systolic Blood Pressure  mmHg    Diastolic Blood Pressure  mmHg    Ventricular Rate 61 BPM    Atrial Rate 61 BPM    MN Interval 156 ms    QRS Duration 88 ms     ms    QTc 420 ms    P Axis 40 degrees    R AXIS 39 degrees    T Axis 31 degrees    Interpretation ECG       Sinus rhythm  Anterior infarct (cited on or before 24-JUN-2022)  Abnormal ECG  When compared with ECG of 24-JUN-2022 16:19,  Criteria for Inferior infarct are no longer Present  Confirmed by SEE ED PROVIDER NOTE FOR, ECG INTERPRETATION (4000),  Yuliya Murguia ([id number]) on 2/21/2024 11:31:46 PM     D dimer quantitative    Collection Time: 02/21/24 10:52 PM   Result Value Ref Range    D-Dimer Quantitative 0.51 (H) 0.00 - 0.50 ug/mL FEU   INR    Collection Time: 02/21/24 10:52 PM   Result Value Ref Range    INR 0.97 0.85 - 1.15   Partial thromboplastin time    Collection Time: 02/21/24 10:52 PM   Result Value Ref Range    aPTT 26 22 - 38 Seconds   Basic metabolic panel    Collection Time: 02/21/24 10:52 PM   Result Value Ref Range    Sodium 142 135 - 145 mmol/L    Potassium 3.7 3.4 - 5.3 mmol/L    Chloride 103 98 - 107 mmol/L    Carbon Dioxide (CO2) 28 22 - 29 mmol/L    Anion Gap 11 7 - 15 mmol/L    Urea Nitrogen 24.2 (H) 6.0 - 20.0 mg/dL    Creatinine 0.66 0.51 - 0.95 mg/dL    GFR Estimate >90 >60 mL/min/1.73m2    Calcium 9.2 8.6 - 10.0 mg/dL    Glucose 107 (H) 70 - 99 mg/dL   Troponin T, High Sensitivity    Collection Time: 02/21/24 10:52 PM   Result Value Ref Range    Troponin T, High Sensitivity <6 <=14 ng/L   Nt probnp inpatient (BNP)    Collection Time: 02/21/24 10:52 PM   Result Value Ref Range    N terminal Pro BNP Inpatient <36 0 - 900 pg/mL   CBC with platelets and differential    Collection Time: 02/21/24 10:52 PM   Result  "Value Ref Range    WBC Count 6.9 4.0 - 11.0 10e3/uL    RBC Count 5.50 (H) 3.80 - 5.20 10e6/uL    Hemoglobin 14.4 11.7 - 15.7 g/dL    Hematocrit 44.9 35.0 - 47.0 %    MCV 82 78 - 100 fL    MCH 26.2 (L) 26.5 - 33.0 pg    MCHC 32.1 31.5 - 36.5 g/dL    RDW 13.7 10.0 - 15.0 %    Platelet Count 252 150 - 450 10e3/uL    % Neutrophils 39 %    % Lymphocytes 51 %    % Monocytes 8 %    % Eosinophils 1 %    % Basophils 1 %    % Immature Granulocytes 0 %    NRBCs per 100 WBC 0 <1 /100    Absolute Neutrophils 2.7 1.6 - 8.3 10e3/uL    Absolute Lymphocytes 3.5 0.8 - 5.3 10e3/uL    Absolute Monocytes 0.5 0.0 - 1.3 10e3/uL    Absolute Eosinophils 0.1 0.0 - 0.7 10e3/uL    Absolute Basophils 0.1 0.0 - 0.2 10e3/uL    Absolute Immature Granulocytes 0.0 <=0.4 10e3/uL    Absolute NRBCs 0.0 10e3/uL   Extra Red Top Tube    Collection Time: 02/21/24 10:53 PM   Result Value Ref Range    Hold Specimen JIC        No results found for: \"ABORH\"        RADIOLOGY:  Reviewed all pertinent imaging. Please see official radiology report.    CT Chest Pulmonary Embolism w Contrast   Final Result   IMPRESSION:   1.  Negative for pulmonary embolism.      2.  No evidence of pneumonia or pulmonary edema.      US Lower Extremity Venous Duplex Bilateral   Final Result   IMPRESSION:   1.  No deep venous thrombosis in the bilateral lower extremities.      2.  Thrombosed superficial vein in the right mid thigh.            EKG:    Indication: Chest pain    Performed at: 22:20p  Impression: Sinus rhythm at 61 bpm.  Flipped T waves in lead aVR and V1.  NV interval 156 ms, QRS 88 ms, QTc 420 ms.  Nonspecific ST changes compared to June 24, 2022.      I have independently reviewed and interpreted the EKG(s) documented above.        PROCEDURES:  none    Medical Decision Making  Obtained supplemental history:Supplemental history obtained?: No  Reviewed external records: External records reviewed?: Documented in chart and Outpatient Record: see HPI  Care impacted by " chronic illness:Diabetes and Hypertension  Care significantly affected by social determinants of health:Access to Medical Care  Did you consider but not order tests?: Work up considered but not performed and documented in chart, if applicable  Did you interpret images independently?: Independent interpretation of ECG and images noted in documentation, when applicable.  Consultation discussion with other provider:Did you involve another provider (consultant, , pharmacy, etc.)?: No  Discharge. No recommendations on prescription strength medication(s). I considered admission, but ultimately discharged patient laboratories and imaging are reassuring.  There is a superficial clot in the right anterior thigh.  This may correlate with the Aborted right anterior accessory saphenous vein radiofrequency ablation attempt.  Will have her discuss this with her vein clinic but likely no treatment needed since it is superficial.  She does have some bruising in this assumed area.      Leidy Trent M.D. [facility]  Emergency Medicine and Medical Toxicology  Formerly [facility] EMERGENCY ROOM  [address] [id number] [phone]  Dept: [phone]           Leidy Trent MD  02/22/24 0157       Leidy Trent MD  02/22/24 8287

## 2024-03-14 ENCOUNTER — TELEPHONE (OUTPATIENT)
Dept: VASCULAR SURGERY | Facility: CLINIC | Age: 54
End: 2024-03-14
Payer: COMMERCIAL

## 2024-03-14 NOTE — TELEPHONE ENCOUNTER
Pt had a Right leg VNUS closure ASV and 1 unit phlebs (med nec), Left leg VNUS closure GSV and 0.5 unit phlebs (med nec) on 2-13-24 and has some follow up questions.

## 2024-03-14 NOTE — TELEPHONE ENCOUNTER
Vein Clinic - Nurse Triage Call    Situation: Pt calling c/o lumps    Background: Pt had vein procedure: Left leg VNUS closure GSV and 0.5 unit phlebs (med nec), Right leg VNUS closure ASV (could not access) and 1 unit phlebs (med nec) on 2/13/24 with K[facility].     Assessment: Patient was concerned with possible lumps undeneath this skin following surgery. Denies redness, warmth, swelling at this time. Patient did report rash on right thigh following procedure but has started to go away. No other concerns from patient at this time.     Recommendation: Reviewed post procedure instructions. Reassured patient that the lumps she is feeling are a common finding following the procedure she had. Explained these lumps can take awhile to go away, and as long as there is no redness, warmth, swelling, in the areas patient is fine to wait until her 6 week follow up appt. Instructed patient to call with worsening symptoms or other questions/concerns she has. Patient verbalized understanding of all information.       Fariha Adkins RN

## 2024-03-28 ENCOUNTER — OFFICE VISIT (OUTPATIENT)
Dept: VASCULAR SURGERY | Facility: CLINIC | Age: 54
End: 2024-03-28
Payer: COMMERCIAL

## 2024-03-28 DIAGNOSIS — I83.813 VARICOSE VEINS OF LOWER EXTREMITY WITH PAIN, BILATERAL: ICD-10-CM

## 2024-03-28 DIAGNOSIS — I83.813 VARICOSE VEINS OF BOTH LOWER EXTREMITIES WITH PAIN: Primary | ICD-10-CM

## 2024-03-28 PROCEDURE — 99207 PR VEINSOLUTIONS POST OPERATIVE VISIT: CPT | Performed by: SURGERY

## 2024-03-28 NOTE — LETTER
3/28/2024         RE: Johanna Dominique  [address] [id number]        Dear Colleague,    Thank you for referring your patient, Johanna Dominique, to the [facility] [place]. Please see a copy of my visit note below.    Ms. Johanna Dominique is an exceedingly pleasant 53-year-old lady who we did left great saphenous vein radiofrequency ablation and attempted right anterior accessory saphenous vein ablation.  Right anterior accessory saphenous vein procedure was aborted because the vein went into spasm and I could not access it anymore.    Nevertheless we did stab phlebectomies which were medically necessary in both lower extremities.    She has done well from that standpoint.    However she had gone to the emergency department a few days after the index procedure because she was having chest pain and was appropriately treated.  There was no pulmonary embolism or deep venous thrombosis.    Her surgical sites are healing nicely.    5.  Shoulder.  We will see her back and I look forward to it when she comes back to see us for the 6-month visit.    Again, thank you for allowing me to participate in the care of your patient.        Sincerely,        Trung Mullen MD

## 2024-03-28 NOTE — PROGRESS NOTES
Ms. Johanna Dominique is an exceedingly pleasant 53-year-old lady who we did left great saphenous vein radiofrequency ablation and attempted right anterior accessory saphenous vein ablation.  Right anterior accessory saphenous vein procedure was aborted because the vein went into spasm and I could not access it anymore.    Nevertheless we did stab phlebectomies which were medically necessary in both lower extremities.    She has done well from that standpoint.    However she had gone to the emergency department a few days after the index procedure because she was having chest pain and was appropriately treated.  There was no pulmonary embolism or deep venous thrombosis.    Her surgical sites are healing nicely.    5.  Shoulder.  We will see her back and I look forward to it when she comes back to see us for the 6-month visit.

## 2024-05-21 ENCOUNTER — LAB REQUISITION (OUTPATIENT)
Dept: LAB | Facility: CLINIC | Age: 54
End: 2024-05-21

## 2024-05-21 DIAGNOSIS — E78.5 HYPERLIPIDEMIA, UNSPECIFIED: ICD-10-CM

## 2024-05-21 DIAGNOSIS — I10 ESSENTIAL (PRIMARY) HYPERTENSION: ICD-10-CM

## 2024-05-21 PROCEDURE — 80053 COMPREHEN METABOLIC PANEL: CPT | Performed by: PHYSICIAN ASSISTANT

## 2024-05-21 PROCEDURE — 80061 LIPID PANEL: CPT | Performed by: PHYSICIAN ASSISTANT

## 2024-05-22 LAB
ALBUMIN SERPL BCG-MCNC: 4.5 G/DL (ref 3.5–5.2)
ALP SERPL-CCNC: 67 U/L (ref 40–150)
ALT SERPL W P-5'-P-CCNC: 42 U/L (ref 0–50)
ANION GAP SERPL CALCULATED.3IONS-SCNC: 12 MMOL/L (ref 7–15)
AST SERPL W P-5'-P-CCNC: 36 U/L (ref 0–45)
BILIRUB SERPL-MCNC: 0.5 MG/DL
BUN SERPL-MCNC: 24.1 MG/DL (ref 6–20)
CALCIUM SERPL-MCNC: 9.4 MG/DL (ref 8.6–10)
CHLORIDE SERPL-SCNC: 100 MMOL/L (ref 98–107)
CHOLEST SERPL-MCNC: 160 MG/DL
CREAT SERPL-MCNC: 0.61 MG/DL (ref 0.51–0.95)
DEPRECATED HCO3 PLAS-SCNC: 28 MMOL/L (ref 22–29)
EGFRCR SERPLBLD CKD-EPI 2021: >90 ML/MIN/1.73M2
FASTING STATUS PATIENT QL REPORTED: ABNORMAL
FASTING STATUS PATIENT QL REPORTED: NORMAL
GLUCOSE SERPL-MCNC: 131 MG/DL (ref 70–99)
HDLC SERPL-MCNC: 74 MG/DL
LDLC SERPL CALC-MCNC: 69 MG/DL
NONHDLC SERPL-MCNC: 86 MG/DL
POTASSIUM SERPL-SCNC: 4.2 MMOL/L (ref 3.4–5.3)
PROT SERPL-MCNC: 7 G/DL (ref 6.4–8.3)
SODIUM SERPL-SCNC: 140 MMOL/L (ref 135–145)
TRIGL SERPL-MCNC: 84 MG/DL

## 2024-06-23 ENCOUNTER — HEALTH MAINTENANCE LETTER (OUTPATIENT)
Age: 54
End: 2024-06-23

## 2024-09-26 ENCOUNTER — OFFICE VISIT (OUTPATIENT)
Dept: VASCULAR SURGERY | Facility: CLINIC | Age: 54
End: 2024-09-26
Attending: SURGERY
Payer: COMMERCIAL

## 2024-09-26 ENCOUNTER — ANCILLARY PROCEDURE (OUTPATIENT)
Dept: ULTRASOUND IMAGING | Facility: CLINIC | Age: 54
End: 2024-09-26
Attending: SURGERY
Payer: COMMERCIAL

## 2024-09-26 DIAGNOSIS — I83.813 VARICOSE VEINS OF BOTH LOWER EXTREMITIES WITH PAIN: ICD-10-CM

## 2024-09-26 DIAGNOSIS — I83.813 VARICOSE VEINS OF LOWER EXTREMITY WITH PAIN, BILATERAL: Primary | ICD-10-CM

## 2024-09-26 PROCEDURE — 99213 OFFICE O/P EST LOW 20 MIN: CPT | Performed by: SURGERY

## 2024-09-26 PROCEDURE — 93971 EXTREMITY STUDY: CPT | Mod: LT | Performed by: SURGERY

## 2024-09-26 NOTE — PATIENT INSTRUCTIONS
Sclerotherapy: Pre-Treatment Instructions    Recommended Sessions:  __1+__ treatment sessions    Pricing: Full session - $407                 *Payment is due at the time of visit following the treatment    Time Required per Treatment Session - About 45 minutes  Please come in 15 minutes before your scheduled appointment.  30 min.  Sclerotherapy treatments last approximately 30 minutes.  5 min.    A staff member will wipe your legs off with warm water and dry them with a wash cloth.                 Then you can put your compression hose on, get dressed and check out.  10 min.  After your treatment, you will be asked to walk around for 10 minutes before you get in your car.    Medications  Five days before your appointment, discontinue aspirin (Bufferin, Anacin, etc.) and Ibuprofen (Motrin, Advil, Aleve, etc.) to reduce bruising. Resume these medications the day following the treatment.    Leg Preparation  Do not shave your legs or apply any oil, lotion or powder the night before or the day of your treatment.    Clothing  Shorts:  Bring a pair of loose, comfortable shorts to wear during your treatment (or you can choose to wear ours). Shoes: Bring comfortable shoes to accommodate the compression hose after your treatment. Do not wear flip-flops or thong-style sandals unless you have open-toe compression hose.    Photographs  Photos will be taken before each treatment. This helps monitor your progress.    Injections  The physician will inject your veins with the sclerotherapy solution chosen to meet your specific needs.    Compression Hose  Please bring your compression hose if you have them. They may also be reserved for you at our clinic. Compression hose must be worn immediately after each sclerotherapy treatment.  The hose must be compression level 20-30, and they must be worn for 24 hours straight after your treatment.  If you have never worn compression hose before, a staff member will teach you how to put  them on.             You cannot have a treatment without compression hose.               They are critical to the success of your treatment!    You may purchase your compression hose from us. We will measure you and have the hose available when you come for your treatment.    Cancellation and Rescheduling  If you need to cancel or reschedule your sclerotherapy treatment, please give our office at least 24 hour notice.    Sclerotherapy: Basic Information    What is sclerotherapy?  Sclerotherapy is a treatment for  spider  veins.  Spider  veins are small veins just under your skin that can look red, blue or purple. Most  spider  veins are only a cosmetic problem.  Spider  veins are not useful and treating them will not affect your circulation.    How does sclerotherapy work?  1.  Injections: A very small needle is used to inject a solution into the  spider  veins. The solution irritates the cells that line the vein walls. This causes the veins to collapse. The vein walls to stick together and they can no longer carry blood. Different solutions are used based on the size of the veins.  2.  Compression:  The spider veins are kept collapsed by wearing compression stockings. Your body will break down and absorb the treated veins. You wear the compression hose for 24 hours after the treatment and then for 4 more days during your waking hours only.    How does the body heal after sclerotherapy?  The process is similar to how your body heals after a bad bruise. It takes 4-6 weeks or more for the healing to be complete. When the healing is complete, the vein is no longer visible. It may take more than one treatment.    How do I get the best results?  It is important to follow the post-sclerotherapy instructions. The best results require time and patience. The injection sites will continue to heal and fade for months after a treatment. Please discuss your expectations with your doctor to keep them realistic. Your doctor will  do everything possible to meet or exceed your expectations.    How many treatments are needed?  After your initial exam, your doctor will give you an estimate of the number of treatments that may be required. It depends upon the size, type, and quantity of your  spider  veins and on the doctor's assessment, your history and expectations. You may end up needing fewer or more treatments.    How soon can I have another treatment?  Additional treatments are scheduled every 4-6 weeks to allow time for the body to respond to the previous treatment.    Common Side Effects:  Itching  The areas that were injected may itch. This is usually mild and lasts less than a day. Do not use lotions or creams on your legs until the injection sites have healed over.    Pain  It is common to have some tenderness at the injection sites. Injection of the solution can be uncomfortable, but is usually well tolerated by most patients. The tenderness is temporary, lasting 24 hours at most. Tylenol or Ibuprofen can be used, if needed, following the product directions.    Bruising  This may occur at the injections sites. Bruising may be minimized by avoiding Aspirin and Ibuprofen products for five days before each treatment session.    Hyperpigmentation  A light brown discoloration of the skin may develop along the veins in the areas injected. Approximately 20-30% of patients treated note the discoloration (which is lighter and less obvious than the veins that are being treated). The hyperpigmentation usually fades in a couple of weeks, but may take several months to a year to totally resolve. There is a 1% chance of hyperpigmentation continuing after one year.    Trapped blood  A small amount of blood may become trapped and hardened in the veins. This may feel like a knot or cord and it may look dark blue or bruised. This is a common occurrence. You may need to return before your next treatment so this area can be drained to remove the trapped  blood. This will reduce the hyperpigmentation that can occur. The chance of this occurring can be decreased with proper use of compression hose after your treatment.    Matting  Matting is the formation of new, fine  spider  veins in the area injected.  It occurs in approximately 10% of patients injected. The exact reason for this is unknown. If untreated, the matting usually resolves in 3 to 12 months, but very rarely, it can be permanent. If the matting does not fade, it can be re-injected.    Rare Side Effects:  Ulceration at injection sites  Very rarely, a small ulcer will occur at the site where a vein is injected. An ulcer can take 4 to 6 weeks to completely heal. A small scar may result.    Allergic reactions  There is a very rare incidence of an allergic reaction to the solution injected. You will be observed for such reaction and will be treated appropriately should it occur. Please inform us of any allergy history.    Pulmonary embolus/Deep vein thrombosis  This is a blood clot which moves to the lungs/a blood clot in the deep vein system. There is an extraordinarily low incidence of this complication.      SCLEROTHERAPY AFTER CARE  Immediately:  After treatment, walk for 10-15 minutes before getting in your car.  If your trip home is more than 1 hour, stop and walk around for 5-10 minutes. Avoid sitting or standing for extended periods.   First 24 hours: Wear your compression continuously, even while in bed. After the 24 hours, you may shower if you want to. Put your hose back on, unless you are going to bed. You should NOT wear compression to bed after the first 24 hours. You may fly the next day, but wear your compression.   For 5 days: Wear the compression hose for waking hours only. You may continue to wear them longer than 5 days if you prefer.   For days 5-7: Walking is encouraged, as it promotes efficient circulation in your veins. You may do activities that raise your heart rate, but do NOT run,  jog, do high impact aerobics, or weight lifting. After 7 days, no activity restrictions.  Shaving: Wait a few days to shave or apply lotion.   Bathing: Do NOT take hot baths or sit in a hot tub for 7-10 days.    For 1 year: Wear SPF 30 sunscreen on your legs when in the sun. This is very important! It helps prevent darkening of the skin at the injection sites.   Medications: You may resume your usual medications, including aspirin or ibuprofen.    Common Things to Expect       Compression must be worn for the first 24 hours and then during the day for 5-7 days.    If larger veins are treated with ultrasound-guided sclerotherapy, you will have redness, firmness, tenderness, and swelling.  This firmness and tenderness may take 3-6 months to resolve. Ibuprofen and compression hose will aid in this process.    You will have bruising that can last up to 3 weeks. Most fading of the veins will occur between 3 and 6 weeks after treatment.    You may notice brown discoloration (hyperpigmentation) at the treatment site.  This should fade with time, but will take 3 months to 1 year to fully heal.     Some treated veins may look darker because of trapped blood within the vein. This trapped blood can be removed at a minimum of 1 month following treatment. Larger veins are more likely to develop trapped blood.    It is very important for you to use at least SPF 30 sunscreen in order to help prevent the discoloration of your skin.    Migraines rarely occur following sclerotherapy, but are more likely in patients with a history of migraines.  Treat as you would any other migraine.

## 2024-09-26 NOTE — PROGRESS NOTES
After Visit Follow Up  Per Dr. Mullen, patient was recommended to have 1 + sessions at $407 of cosmetic sclerotherapy.    Reviewed with and gave to patient our vein clinic sclerotherapy packet of information which includes basic sclerotherapy information, pre-treatment instructions and post-treatment instructions.    Pt has thigh high compression hose already.    Patient in agreement with plan and had no further questions.    Gayathri Gale RN  [facility]

## 2024-09-26 NOTE — LETTER
9/26/2024      Johanna Dominique  [address] [address]      Dear Colleague,    Thank you for referring your patient, Johanna Dominique, to the [facility] [place]. Please see a copy of my visit note below.    After Visit Follow Up  Per Dr. Mullen, patient was recommended to have 1 + sessions at $407 of cosmetic sclerotherapy.    Reviewed with and gave to patient our vein clinic sclerotherapy packet of information which includes basic sclerotherapy information, pre-treatment instructions and post-treatment instructions.    Pt has thigh high compression hose already.    Patient in agreement with plan and had no further questions.    Gayathri Gale, RN  [facility]  Vein [facility]    Johanna Dominique is a pleasant evaluation of bilateral lower extremity varicose veins.  In February of this year we did bilateral lower extremity phlebectomies.  We did left great saphenous vein ablation.  I attempted to do a right anterior accessory saphenous vein ablation but I could not access the vein.  Nevertheless she has responded well to the above treatment.    Sonography shows that the left great saphenous vein is closed from the ablation procedure.    She does have some spider veins that she would like to address on a cosmetic basis.    Cost estimates were given to her and she will think and let us know.      Again, thank you for allowing me to participate in the care of your patient.        Sincerely,        Trung Mullen MD

## 2024-09-26 NOTE — PROGRESS NOTES
Johanna Dominique is a pleasant evaluation of bilateral lower extremity varicose veins.  In February of this year we did bilateral lower extremity phlebectomies.  We did left great saphenous vein ablation.  I attempted to do a right anterior accessory saphenous vein ablation but I could not access the vein.  Nevertheless she has responded well to the above treatment.    Sonography shows that the left great saphenous vein is closed from the ablation procedure.    She does have some spider veins that she would like to address on a cosmetic basis.    Cost estimates were given to her and she will think and let us know.

## 2024-11-10 ENCOUNTER — HEALTH MAINTENANCE LETTER (OUTPATIENT)
Age: 54
End: 2024-11-10

## 2024-11-12 ENCOUNTER — LAB (OUTPATIENT)
Dept: LAB | Facility: CLINIC | Age: 54
End: 2024-11-12
Payer: COMMERCIAL

## 2024-11-12 DIAGNOSIS — M25.539 WRIST PAIN: Primary | ICD-10-CM

## 2024-11-12 LAB
BASOPHILS # BLD AUTO: 0.1 10E3/UL (ref 0–0.2)
BASOPHILS NFR BLD AUTO: 1 %
CA-I BLD-MCNC: 4.7 MG/DL (ref 4.4–5.2)
CRP SERPL-MCNC: <3 MG/L
EOSINOPHIL # BLD AUTO: 0.1 10E3/UL (ref 0–0.7)
EOSINOPHIL NFR BLD AUTO: 1 %
ERYTHROCYTE [DISTWIDTH] IN BLOOD BY AUTOMATED COUNT: 13.8 % (ref 10–15)
ERYTHROCYTE [SEDIMENTATION RATE] IN BLOOD BY WESTERGREN METHOD: 8 MM/HR (ref 0–30)
ERYTHROCYTE [SEDIMENTATION RATE] IN BLOOD BY WESTERGREN METHOD: 8 MM/HR (ref 0–30)
HCT VFR BLD AUTO: 46.8 % (ref 35–47)
HGB BLD-MCNC: 15 G/DL (ref 11.7–15.7)
HOLD SPECIMEN: NORMAL
IMM GRANULOCYTES # BLD: 0 10E3/UL
IMM GRANULOCYTES NFR BLD: 0 %
LYMPHOCYTES # BLD AUTO: 2 10E3/UL (ref 0.8–5.3)
LYMPHOCYTES NFR BLD AUTO: 29 %
MCH RBC QN AUTO: 26.2 PG (ref 26.5–33)
MCHC RBC AUTO-ENTMCNC: 32.1 G/DL (ref 31.5–36.5)
MCV RBC AUTO: 82 FL (ref 78–100)
MONOCYTES # BLD AUTO: 0.5 10E3/UL (ref 0–1.3)
MONOCYTES NFR BLD AUTO: 8 %
NEUTROPHILS # BLD AUTO: 4.4 10E3/UL (ref 1.6–8.3)
NEUTROPHILS NFR BLD AUTO: 62 %
PLATELET # BLD AUTO: 237 10E3/UL (ref 150–450)
RBC # BLD AUTO: 5.73 10E6/UL (ref 3.8–5.2)
RHEUMATOID FACT SERPL-ACNC: <10 IU/ML
TSH SERPL DL<=0.005 MIU/L-ACNC: 1.71 UIU/ML (ref 0.3–4.2)
URATE SERPL-MCNC: 3.9 MG/DL (ref 2.4–5.7)
VIT D+METAB SERPL-MCNC: 32 NG/ML (ref 20–50)
WBC # BLD AUTO: 7.2 10E3/UL (ref 4–11)

## 2024-11-12 PROCEDURE — 36415 COLL VENOUS BLD VENIPUNCTURE: CPT

## 2024-11-12 PROCEDURE — 82306 VITAMIN D 25 HYDROXY: CPT

## 2024-11-12 PROCEDURE — 84550 ASSAY OF BLOOD/URIC ACID: CPT

## 2024-11-12 PROCEDURE — 86140 C-REACTIVE PROTEIN: CPT

## 2024-11-12 PROCEDURE — 85652 RBC SED RATE AUTOMATED: CPT

## 2024-11-12 PROCEDURE — 86618 LYME DISEASE ANTIBODY: CPT

## 2024-11-12 PROCEDURE — 86038 ANTINUCLEAR ANTIBODIES: CPT

## 2024-11-12 PROCEDURE — 81374 HLA I TYPING 1 ANTIGEN LR: CPT

## 2024-11-12 PROCEDURE — 82330 ASSAY OF CALCIUM: CPT

## 2024-11-12 PROCEDURE — 85025 COMPLETE CBC W/AUTO DIFF WBC: CPT

## 2024-11-12 PROCEDURE — 84443 ASSAY THYROID STIM HORMONE: CPT

## 2024-11-12 PROCEDURE — 86431 RHEUMATOID FACTOR QUANT: CPT

## 2024-11-13 LAB
ANA SER QL IF: NEGATIVE
B BURGDOR IGG+IGM SER QL: 0.04

## 2024-11-15 LAB
B LOCUS: NORMAL
B27TEST METHOD: NORMAL

## 2025-03-02 ENCOUNTER — HEALTH MAINTENANCE LETTER (OUTPATIENT)
Age: 55
End: 2025-03-02

## 2025-06-21 ENCOUNTER — HEALTH MAINTENANCE LETTER (OUTPATIENT)
Age: 55
End: 2025-06-21